# Patient Record
Sex: FEMALE | Race: WHITE | Employment: FULL TIME | URBAN - METROPOLITAN AREA
[De-identification: names, ages, dates, MRNs, and addresses within clinical notes are randomized per-mention and may not be internally consistent; named-entity substitution may affect disease eponyms.]

---

## 2020-01-20 ENCOUNTER — OFFICE VISIT (OUTPATIENT)
Dept: FAMILY MEDICINE CLINIC | Age: 60
End: 2020-01-20

## 2020-01-20 VITALS
RESPIRATION RATE: 17 BRPM | HEIGHT: 62 IN | HEART RATE: 63 BPM | OXYGEN SATURATION: 98 % | WEIGHT: 212.8 LBS | DIASTOLIC BLOOD PRESSURE: 72 MMHG | BODY MASS INDEX: 39.16 KG/M2 | TEMPERATURE: 97.8 F | SYSTOLIC BLOOD PRESSURE: 130 MMHG

## 2020-01-20 DIAGNOSIS — E66.01 SEVERE OBESITY (HCC): ICD-10-CM

## 2020-01-20 DIAGNOSIS — E78.00 HYPERCHOLESTEREMIA: ICD-10-CM

## 2020-01-20 DIAGNOSIS — Z11.59 ENCOUNTER FOR HEPATITIS C SCREENING TEST FOR LOW RISK PATIENT: ICD-10-CM

## 2020-01-20 DIAGNOSIS — Z13.1 SCREENING FOR DIABETES MELLITUS: ICD-10-CM

## 2020-01-20 DIAGNOSIS — E55.9 VITAMIN D DEFICIENCY: ICD-10-CM

## 2020-01-20 DIAGNOSIS — I10 ESSENTIAL HYPERTENSION: ICD-10-CM

## 2020-01-20 DIAGNOSIS — Z23 NEED FOR SHINGLES VACCINE: ICD-10-CM

## 2020-01-20 DIAGNOSIS — Z23 ENCOUNTER FOR IMMUNIZATION: ICD-10-CM

## 2020-01-20 DIAGNOSIS — M79.645 BILATERAL THUMB PAIN: Primary | ICD-10-CM

## 2020-01-20 DIAGNOSIS — M79.644 BILATERAL THUMB PAIN: Primary | ICD-10-CM

## 2020-01-20 RX ORDER — POTASSIUM CHLORIDE 20 MEQ/1
TABLET, EXTENDED RELEASE ORAL
COMMUNITY
Start: 2019-10-24 | End: 2020-04-06 | Stop reason: SDUPTHER

## 2020-01-20 RX ORDER — DICLOFENAC SODIUM 10 MG/G
GEL TOPICAL
Qty: 1 EACH | Refills: 1 | Status: SHIPPED | OUTPATIENT
Start: 2020-01-20 | End: 2020-07-01

## 2020-01-20 RX ORDER — SALIVA STIMULANT COMB. NO.4
500 SPRAY, NON-AEROSOL (ML) MUCOUS MEMBRANE
COMMUNITY

## 2020-01-20 RX ORDER — ATENOLOL AND CHLORTHALIDONE TABLET 50; 25 MG/1; MG/1
1 TABLET ORAL DAILY
COMMUNITY
End: 2020-04-06 | Stop reason: SDUPTHER

## 2020-01-20 RX ORDER — SIMVASTATIN 40 MG/1
TABLET, FILM COATED ORAL
COMMUNITY
Start: 2019-10-24 | End: 2020-03-06 | Stop reason: SDUPTHER

## 2020-01-20 RX ORDER — ACETAMINOPHEN 500 MG
TABLET ORAL 2 TIMES DAILY
COMMUNITY
End: 2022-02-07

## 2020-01-20 RX ORDER — HYDROXYZINE 25 MG/1
TABLET, FILM COATED ORAL
COMMUNITY
Start: 2020-01-18 | End: 2020-04-06 | Stop reason: SDUPTHER

## 2020-01-20 NOTE — PROGRESS NOTES
Chief Complaint   Patient presents with   [address]    Joint Pain     bilateral thumbs x 1-2 years       1. Have you been to the ER, urgent care clinic since your last visit? Hospitalized since your last visit? No    2. Have you seen or consulted any other health care providers outside of the [address] [place] [address] since your last visit? Include any pap smears or colon screening. Patient was seen for mammogram at [facility] 2018 will obtain records.

## 2020-01-20 NOTE — PATIENT INSTRUCTIONS
Liraglutide (By injection)   Liraglutide (ket-t-FBSY-tide)  Treats type 2 diabetes and helps with weight loss in certain patients. Also reduces the risk of heart attacks and strokes in patients with type 2 diabetes and heart or blood vessel disease. Brand Name(s): Saxenda, Victoza   There may be other brand names for this medicine. When This Medicine Should Not Be Used: This medicine is not right for everyone. Do not use it if you had an allergic reaction to liraglutide, or if you have multiple endocrine neoplasia syndrome type 2 (MEN 2) or if you or anyone in your family had medullary thyroid cancer. Tell your doctor if you are pregnant or have become pregnant while you are using this medicine. How to Use This Medicine:   Injectable  · Your doctor will prescribe your exact dose and tell you how often it should be given. This medicine is given as a shot under the skin of your stomach, thighs, or upper arms. · If you use insulin in addition to this medicine, do not mix them into the same syringe. You may give the shots in the same area (including your stomach), but do not give the shots right next to each other. · You may be taught how to give your medicine at home. Make sure you understand all instructions before giving yourself an injection. Do not use more medicine or use it more often than your doctor tells you to. · Check the liquid in the pen. It should be clear and colorless. Do not use it if it is cloudy, discolored, or has particles in it. · You will be shown the body areas where this shot can be given. Use a different body area each time you give yourself a shot. Keep track of where you give each shot to make sure you rotate body areas. · Use a new needle and syringe each time you inject your medicine. · Never share medicine pens with others under any circumstances. Sharing needles or pens can result in transmission of infection.   · Drink extra fluids so you will urinate more often and help prevent kidney problems. · This medicine should come with a Medication Guide. Ask your pharmacist for a copy if you do not have one. · Missed dose: If you miss a dose of this medicine, use it as soon as you remember. Then take your next dose at your usual time. Never take extra medicine to make up for a missed dose. If you miss a dose for 3 days or more, call your doctor to talk about how to restart your treatment. · Store your new, unused medicine pen in its original carton in the refrigerator. Protect it from light. Do not freeze this medicine or use it if it has been frozen. You may store the opened medicine pen in the refrigerator or at room temperature for 30 days. Throw away your used pen after 30 days, even if it still has medicine in it. Always remove the needle from the pen before you store it. · Throw away used needles in a hard, closed container that the needles cannot poke through. Keep this container away from children and pets. Drugs and Foods to Avoid:      Ask your doctor or pharmacist before using any other medicine, including over-the-counter medicines, vitamins, and herbal products. Warnings While Using This Medicine:   · Tell your doctor if you are breastfeeding, or if you have kidney disease, liver disease, digestion problems (including gastroparesis), gallbladder disease, or a history of pancreas problems, depression, or angioedema (swelling of the arms, face, hands, mouth, or throat). · Do not use Saxenda® if you are also using Victoza®. They contain the same medicine. · This medicine may cause the following problems:   ¨ Increased risk for thyroid tumors  ¨ Pancreatitis  ¨ Low blood sugar  ¨ Kidney problems  ¨ Gallbladder problems, including gallstones  ¨ Thoughts of hurting yourself Linh Carry)  · Your doctor will do lab tests at regular visits to check on the effects of this medicine. Keep all appointments. · Keep all medicine out of the reach of children.  Never share your medicine with anyone. Possible Side Effects While Using This Medicine:   Call your doctor right away if you notice any of these side effects:  · Allergic reaction: Itching or hives, swelling in your face or hands, swelling or tingling in your mouth or throat, chest tightness, trouble breathing  · Change in how much or how often you urinate, painful or burning urination  · Feeling sad or depressed, thoughts of suicide, unusual changes in mood or behavior  · Shaking, trembling, sweating, fast or pounding heartbeat, fainting, hunger, confusion  · Sudden and severe stomach pain, nausea, vomiting, fever, lightheadedness  · Trouble breathing or swallowing, a lump in your neck, hoarseness when speaking  · Yellow skin or eyes  If you notice these less serious side effects, talk with your doctor:   · Decreased appetite  · Diarrhea, constipation, stomach upset  · Dizziness  · Headache  · Redness, itching, swelling, or any changes in your skin where the shot was given  If you notice other side effects that you think are caused by this medicine, tell your doctor. Call your doctor for medical advice about side effects. You may report side effects to FDA at 0-138-FDA-5703  © 2017 [address] Information is for End User's use only and may not be sold, redistributed or otherwise used for commercial purposes. The above information is an  only. It is not intended as medical advice for individual conditions or treatments. Talk to your doctor, nurse or pharmacist before following any medical regimen to see if it is safe and effective for you. Thumb Arthritis: Exercises  Introduction  Here are some examples of exercises for you to try. The exercises may be suggested for a condition or for rehabilitation. Start each exercise slowly. Ease off the exercises if you start to have pain. You will be told when to start these exercises and which ones will work best for you.   How to do the exercises  Thumb IP flexion    1. Place your forearm and hand on a table with your affected thumb pointing up. 2. With your other hand, hold your thumb steady just below the joint nearest your thumbnail. 3. Bend the tip of your thumb downward, then straighten it. 4. Repeat 8 to 12 times. 5. Switch hands and repeat steps 1 through 4, even if only one thumb is sore. Thumb MP flexion    1. Place your forearm and hand on a table with your affected thumb pointing up. 2. With your other hand, hold the base of your thumb and palm steady. 3. Bend your thumb downward where it meets your palm, then straighten it. 4. Repeat 8 to 12 times. 5. Switch hands and repeat steps 1 through 4, even if only one thumb is sore. Thumb opposition    1. With your affected hand, point your fingers and thumb straight up. Your wrist should be relaxed, following the line of your fingers and thumb. 2. Touch your affected thumb to each finger, one finger at a time. This will look like an \"okay\" sign, but try to keep your other fingers straight and pointing upward as much as you can. 3. Repeat 8 to 12 times. 4. Switch hands and repeat steps 1 through 3, even if only one thumb is sore. Follow-up care is a key part of your treatment and safety. Be sure to make and go to all appointments, and call your doctor if you are having problems. It's also a good idea to know your test results and keep a list of the medicines you take. Where can you learn more? Go to http://glen-althea.info/. Enter P104 in the search box to learn more about \"Thumb Arthritis: Exercises. \"  Current as of: June 26, 2019  Content Version: 12.2  © 1846-3591 [employer]. Care instructions adapted under license by [employer] (which disclaims liability or warranty for this information).  If you have questions about a medical condition or this instruction, always ask your healthcare professional. Jack Savage disclaims any warranty or liability for your use of this information. Thumb Arthritis: Exercises  Introduction  Here are some examples of exercises for you to try. The exercises may be suggested for a condition or for rehabilitation. Start each exercise slowly. Ease off the exercises if you start to have pain. You will be told when to start these exercises and which ones will work best for you. How to do the exercises  Thumb IP flexion    6. Place your forearm and hand on a table with your affected thumb pointing up. 7. With your other hand, hold your thumb steady just below the joint nearest your thumbnail. 8. Bend the tip of your thumb downward, then straighten it. 9. Repeat 8 to 12 times. 10. Switch hands and repeat steps 1 through 4, even if only one thumb is sore. Thumb MP flexion    6. Place your forearm and hand on a table with your affected thumb pointing up. 7. With your other hand, hold the base of your thumb and palm steady. 8. Bend your thumb downward where it meets your palm, then straighten it. 9. Repeat 8 to 12 times. 10. Switch hands and repeat steps 1 through 4, even if only one thumb is sore. Thumb opposition    5. With your affected hand, point your fingers and thumb straight up. Your wrist should be relaxed, following the line of your fingers and thumb. 6. Touch your affected thumb to each finger, one finger at a time. This will look like an \"okay\" sign, but try to keep your other fingers straight and pointing upward as much as you can. 7. Repeat 8 to 12 times. 8. Switch hands and repeat steps 1 through 3, even if only one thumb is sore. Follow-up care is a key part of your treatment and safety. Be sure to make and go to all appointments, and call your doctor if you are having problems. It's also a good idea to know your test results and keep a list of the medicines you take. Where can you learn more? Go to http://glen-althea.info/.   Enter W90 in the search box to learn more about \"Thumb Arthritis: Exercises. \"  Current as of: June 26, 2019  Content Version: 12.2  © 7308-1397 [employer]. Care instructions adapted under license by [employer] (which disclaims liability or warranty for this information). If you have questions about a medical condition or this instruction, always ask your healthcare professional. [place] [address] any warranty or liability for your use of this information.

## 2020-01-20 NOTE — PROGRESS NOTES
Sia   62 y.o. female  1960  [address] [address]  <[id number]>     BUNNY Yu [address]       Encounter Date: 1/20/2020           New Patient Visit Note: Augustina Loya MD    Previous PCP: Dr. Poonam Holland [facility]    Reason for Appointment:  Chief Complaint   Patient presents with   [facility]    Joint Pain     bilateral thumbs x 1-2 years       History of Present Illness:  History provided by patient    Sia  is a 62 y.o. female who presents to clinic today for:    Blt Thumb Pain  Duration: 2768-8516  Severity: 7-8/10  Setting:  When she bends it a certain way  Timing: comes and goes lasting a few seconds  Frequency: occurs daily  Medicines: does not take any medicine for it    Hypercholesterolemia  Duration: since around age 46  Medicine: Simvastatin  Side Effects: denies any muscle aches    HTN  Duration: diagnosed around age 46  Medications: Atenolol- Chlorthalidone  Diet: does not adhere adhere to low salt diet  Exercise: recently joined a gym    Obesity  BMI 39.56  Diet: tries to eat healthy  Exercise: recently joined a gym and plans to exercise more    Vitamin D Low  - Takes vit D 2000 units daily      Review of Systems   Denies dyspnea or CP. All other ROS were reviewed and are negative except as discussed in HPI      Allergies: Pcn [penicillins]    Medications: (Updated to reflect final medication list after visit)    Current Outpatient Medications:     simvastatin (ZOCOR) 40 mg tablet, , Disp: , Rfl:     potassium chloride (K-DUR, KLOR-CON) 20 mEq tablet, , Disp: , Rfl:     hydroxyzine HCL (ATARAX) 25 mg tablet, , Disp: , Rfl:     atenoloL-chlorthalidone (TENORETIC) 50-25 mg per tablet, Take 1 Tab by mouth daily.  Indications: 1/2  a tab qd, Disp: , Rfl:     Flaxseed Oil oil, by Does Not Apply route., Disp: , Rfl:     cranberry extract (CRAN-MAX) 500 mg cap capsule, Take 500 mg by mouth., Disp: , Rfl:    B.infantis-B.ani-B.long-B.bifi (PROBIOTIC 4X) 10-15 mg TbEC, Take  by mouth., Disp: , Rfl:     cholecalciferol (VITAMIN D3) (2,000 UNITS /50 MCG) cap capsule, Take  by mouth two (2) times a day., Disp: , Rfl:     varicella-zoster recombinant, PF, (SHINGRIX) 50 mcg/0.5 mL susr injection, 0.5 mL by IntraMUSCular route once for 1 dose., Disp: 0.5 mL, Rfl: 1    diclofenac (VOLTAREN) 1 % gel, Apply  to affected area two (2) times daily as needed for Pain., Disp: 1 Each, Rfl: 1    History  Patient Care Team:  Sarbjit Sen MD as PCP - General (Family Practice)  Shawn Francisco DO as Physician (Obstetrics & Gynecology)       Past Medical History: she has a past medical history of BMI 39.0-39.9,adult (1/20/2020), Essential hypertension (1/20/2020), Hypercholesteremia (1/20/2020), Severe obesity ([address] (1/20/2020), and Vitamin D deficiency (1/20/2020). Past Surgical History: she has a past surgical history that includes hx hysterectomy; hx shoulder arthroscopy (Right); and hx knee arthroscopy (Left). Family Medical History: family history includes Cancer in her father and mother. Father with Lung cancer; Mother with breast cancer    Social History: she reports that she has never smoked. She has never used smokeless tobacco. She reports previous alcohol use. She reports that she does not use drugs. 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 HENT:      Head: Normocephalic and atraumatic. Right Ear: Hearing normal. No drainage. No middle ear effusion. There is no impacted cerumen. Tympanic membrane is not injected or erythematous. Left Ear: Hearing normal. No drainage. No middle ear effusion.  There is no impacted cerumen. Tympanic membrane is not injected or erythematous. Nose: Nose normal. No nasal deformity or septal deviation. Mouth/Throat:      Lips: Pink. No lesions. Mouth: Mucous membranes are moist.      Palate: No mass. Pharynx: Oropharynx is clear. Uvula midline. No pharyngeal swelling, oropharyngeal exudate or posterior oropharyngeal erythema. Tonsils: No tonsillar exudate. Eyes:      General: Lids are normal. Vision grossly intact. Gaze aligned appropriately. Extraocular Movements: Extraocular movements intact. Conjunctiva/sclera:      Right eye: Right conjunctiva is not injected. Left eye: Left conjunctiva is not injected. Pupils: Pupils are equal, round, and reactive to light. Neck:      Musculoskeletal: Normal range of motion and neck supple. No neck rigidity or muscular tenderness. Vascular: No carotid bruit. Cardiovascular:      Rate and Rhythm: Normal rate and regular rhythm. Heart sounds: No murmur. No friction rub. No gallop. Pulmonary:      Effort: No respiratory distress. Breath sounds: No wheezing, rhonchi or rales. Abdominal:      General: Bowel sounds are normal. There is no distension or abdominal bruit. Tenderness: There is no tenderness. Musculoskeletal: Normal range of motion. Cervical back: Normal.   Lymphadenopathy:      Cervical: No cervical adenopathy. Neurological:      General: No focal deficit present. Motor: Motor function is intact. Coordination: Coordination is intact. Gait: Gait is intact. Deep Tendon Reflexes:      Reflex Scores:       Patellar reflexes are 2+ on the right side and 2+ on the left side.   Psychiatric:         Attention and Perception: Attention and perception normal.         Mood and Affect: Mood and affect normal.         Speech: Speech normal.         Cognition and Memory: Cognition and memory normal.         Assessment & Plan:    Diagnoses and all orders for this visit:    1. Bilateral thumb pain: Chronic, worsening; given duration of pain will obtain xrays to evaluate further. Will treat with diclofenac for pain. -     XR THUMB LT MIN 2 V; Future  -     XR THUMB RT MIN 2 V; Future  -     diclofenac (VOLTAREN) 1 % gel; Apply  to affected area two (2) times daily as needed for Pain.  - Consider referral to PT for further treatment    2. Severe obesity ([facility] [address]): I have reviewed/discussed the above normal BMI with the patient. I have recommended the following interventions: dietary management education, guidance, and counseling, encourage exercise, monitor weight and prescribed dietary intake.   -     TSH 3RD GENERATION  -     CBC WITH AUTOMATED DIFF    3. BMI 39.0-39.9,adult (see above)    4. Vitamin D deficiency: Chronic, will check labs  -     VITAMIN D, 25 HYDROXY    5. Hypercholesteremia: Chronic, stable  -     LIPID PANEL  -     METABOLIC PANEL, COMPREHENSIVE  - Continue simvastatin    6. Essential hypertension: Chronic, stable  -     METABOLIC PANEL, COMPREHENSIVE  - continue current regimen    7. Need for shingles vaccine  -     varicella-zoster recombinant, PF, (SHINGRIX) 50 mcg/0.5 mL susr injection; 0.5 mL by IntraMUSCular route once for 1 dose. 8. Screening for diabetes mellitus  -     HEMOGLOBIN A1C WITH EAG    9. Encounter for hepatitis C screening test for low risk patient  -     HEPATITIS C AB, RFLX TO QT BY PCR    10. Encounter for immunization  -     INFLUENZA VIRUS VAC QUAD,SPLIT,PRESV FREE SYRINGE IM  -     MN IMMUNIZ ADMIN,1 SINGLE/COMB VAC/TOXOID          I have discussed the diagnosis with the patient and the intended plan as seen in the above orders. The patient has received an after-visit summary along with patient information handout. I have discussed medication side effects and warnings with the patient as well. Dispostion  Follow-up and Dispositions    · Return in about 4 weeks (around 2/17/2020) for HTN, BMI.            Kiah A Kenneth Larsen MD

## 2020-01-21 LAB
25(OH)D3+25(OH)D2 SERPL-MCNC: 28.7 NG/ML (ref 30–100)
ALBUMIN SERPL-MCNC: 4.3 G/DL (ref 3.8–4.8)
ALBUMIN/GLOB SERPL: 1.5 {RATIO} (ref 1.2–2.2)
ALP SERPL-CCNC: 111 IU/L (ref 39–117)
ALT SERPL-CCNC: 51 IU/L (ref 0–32)
AST SERPL-CCNC: 33 IU/L (ref 0–40)
BASOPHILS # BLD AUTO: 0 X10E3/UL (ref 0–0.2)
BASOPHILS NFR BLD AUTO: 0 %
BILIRUB SERPL-MCNC: 0.3 MG/DL (ref 0–1.2)
BUN SERPL-MCNC: 9 MG/DL (ref 8–27)
BUN/CREAT SERPL: 14 (ref 12–28)
CALCIUM SERPL-MCNC: 9.2 MG/DL (ref 8.7–10.3)
CHLORIDE SERPL-SCNC: 98 MMOL/L (ref 96–106)
CHOLEST SERPL-MCNC: 186 MG/DL (ref 100–199)
CO2 SERPL-SCNC: 27 MMOL/L (ref 20–29)
CREAT SERPL-MCNC: 0.65 MG/DL (ref 0.57–1)
EOSINOPHIL # BLD AUTO: 0.2 X10E3/UL (ref 0–0.4)
EOSINOPHIL NFR BLD AUTO: 2 %
ERYTHROCYTE [DISTWIDTH] IN BLOOD BY AUTOMATED COUNT: 12.9 % (ref 11.7–15.4)
EST. AVERAGE GLUCOSE BLD GHB EST-MCNC: 126 MG/DL
GLOBULIN SER CALC-MCNC: 2.9 G/DL (ref 1.5–4.5)
GLUCOSE SERPL-MCNC: 91 MG/DL (ref 65–99)
HBA1C MFR BLD: 6 % (ref 4.8–5.6)
HCT VFR BLD AUTO: 41.1 % (ref 34–46.6)
HCV AB S/CO SERPL IA: <0.1 S/CO RATIO (ref 0–0.9)
HCV AB SERPL QL IA: NORMAL
HDLC SERPL-MCNC: 60 MG/DL
HGB BLD-MCNC: 14.1 G/DL (ref 11.1–15.9)
IMM GRANULOCYTES # BLD AUTO: 0 X10E3/UL (ref 0–0.1)
IMM GRANULOCYTES NFR BLD AUTO: 0 %
INTERPRETATION, 910389: NORMAL
LDLC SERPL CALC-MCNC: 92 MG/DL (ref 0–99)
LYMPHOCYTES # BLD AUTO: 2.2 X10E3/UL (ref 0.7–3.1)
LYMPHOCYTES NFR BLD AUTO: 31 %
MCH RBC QN AUTO: 29.3 PG (ref 26.6–33)
MCHC RBC AUTO-ENTMCNC: 34.3 G/DL (ref 31.5–35.7)
MCV RBC AUTO: 85 FL (ref 79–97)
MONOCYTES # BLD AUTO: 0.6 X10E3/UL (ref 0.1–0.9)
MONOCYTES NFR BLD AUTO: 8 %
NEUTROPHILS # BLD AUTO: 4 X10E3/UL (ref 1.4–7)
NEUTROPHILS NFR BLD AUTO: 59 %
PLATELET # BLD AUTO: 281 X10E3/UL (ref 150–450)
POTASSIUM SERPL-SCNC: 3.8 MMOL/L (ref 3.5–5.2)
PROT SERPL-MCNC: 7.2 G/DL (ref 6–8.5)
RBC # BLD AUTO: 4.81 X10E6/UL (ref 3.77–5.28)
SODIUM SERPL-SCNC: 141 MMOL/L (ref 134–144)
TRIGL SERPL-MCNC: 168 MG/DL (ref 0–149)
TSH SERPL DL<=0.005 MIU/L-ACNC: 1.99 UIU/ML (ref 0.45–4.5)
VLDLC SERPL CALC-MCNC: 34 MG/DL (ref 5–40)
WBC # BLD AUTO: 7 X10E3/UL (ref 3.4–10.8)

## 2020-01-24 ENCOUNTER — HOSPITAL ENCOUNTER (OUTPATIENT)
Dept: GENERAL RADIOLOGY | Age: 60
Discharge: HOME | End: 2020-01-24
Payer: COMMERCIAL

## 2020-01-24 DIAGNOSIS — M79.644 BILATERAL THUMB PAIN: ICD-10-CM

## 2020-01-24 DIAGNOSIS — M79.645 BILATERAL THUMB PAIN: ICD-10-CM

## 2020-01-24 PROCEDURE — 73140 X-RAY EXAM OF FINGER(S): CPT

## 2020-01-28 ENCOUNTER — TELEPHONE (OUTPATIENT)
Dept: FAMILY MEDICINE CLINIC | Age: 60
End: 2020-01-28

## 2020-01-30 ENCOUNTER — TELEPHONE (OUTPATIENT)
Dept: FAMILY MEDICINE CLINIC | Age: 60
End: 2020-01-30

## 2020-01-30 DIAGNOSIS — M19.049 HAND ARTHRITIS: Primary | ICD-10-CM

## 2020-01-30 PROBLEM — R79.89 ELEVATED LFTS: Status: ACTIVE | Noted: 2020-01-30

## 2020-01-30 PROBLEM — R73.03 PREDIABETES: Status: ACTIVE | Noted: 2020-01-30

## 2020-01-30 NOTE — TELEPHONE ENCOUNTER
01/30/20  3:14 PM    Called patient regarding lab and imaging results. Labs s/f A1c of 6.0 (prediabets), elevated ALT, and low vit D. Will plan to recheck A1c, LFTs, and Vit in April. 2020. Imaging showed degenerative changes to hands. Will provide referral to ortho. Patient expresses agreement.      Andriy Denny MD

## 2020-02-06 ENCOUNTER — TELEPHONE (OUTPATIENT)
Dept: FAMILY MEDICINE CLINIC | Age: 60
End: 2020-02-06

## 2020-02-06 NOTE — TELEPHONE ENCOUNTER
----- Message from Magy Van sent at 2/6/2020 12:52 PM EST -----  Regarding: Dr. Alex Dunlap: [phone]  Caller's first and last name: n/a  Reason for call: Fax Verification   Callback required yes/no and why: Yes   Best contact number(s): [phone]  Details to clarify the request: Dr. Murphy Bass needs the [facility] of the pt hands faxed over to their practice.  The fax is [phone]

## 2020-02-06 NOTE — TELEPHONE ENCOUNTER
X -RAY results faxed to Dr. Vikas Pacheco office at [phone] on 02/06/2020 @ 2:11 pm. Confirmation received.

## 2020-03-06 ENCOUNTER — TELEPHONE (OUTPATIENT)
Dept: FAMILY MEDICINE CLINIC | Age: 60
End: 2020-03-06

## 2020-03-06 DIAGNOSIS — E78.00 HYPERCHOLESTEREMIA: Primary | ICD-10-CM

## 2020-03-06 RX ORDER — SIMVASTATIN 40 MG/1
40 TABLET, FILM COATED ORAL
Qty: 90 TAB | Refills: 1 | Status: SHIPPED | OUTPATIENT
Start: 2020-03-06 | End: 2020-04-06 | Stop reason: SDUPTHER

## 2020-03-06 NOTE — TELEPHONE ENCOUNTER
Refilled as requested    Stefany Stewart MD    -------------------------------------------------------------------------------------------------      ----- Message from Luz Elena Staley sent at 3/6/2020 10:05 AM EST -----  Regarding: Dr. Nile Tyler first and last name: Mallorie Kendrick  Reason for call:   Pt needs a refill of simvastatin 40 mg. Pt said she'll run out of it before her appt on 4/14/2020.  It has to go to [address] [place] [address] on [address] [facility], [phone]  Callback required yes/no and why: yes  Best contact number(s):  [phone]  Details to clarify the request:

## 2020-04-05 ENCOUNTER — PATIENT MESSAGE (OUTPATIENT)
Dept: FAMILY MEDICINE CLINIC | Age: 60
End: 2020-04-05

## 2020-04-05 DIAGNOSIS — E78.00 HYPERCHOLESTEREMIA: ICD-10-CM

## 2020-04-06 RX ORDER — POTASSIUM CHLORIDE 20 MEQ/1
20 TABLET, EXTENDED RELEASE ORAL DAILY
Qty: 90 TAB | Refills: 0 | Status: SHIPPED | OUTPATIENT
Start: 2020-04-06 | End: 2020-04-07 | Stop reason: SDUPTHER

## 2020-04-06 RX ORDER — SIMVASTATIN 40 MG/1
40 TABLET, FILM COATED ORAL
Qty: 90 TAB | Refills: 0 | Status: SHIPPED | OUTPATIENT
Start: 2020-04-06 | End: 2021-01-05 | Stop reason: SDUPTHER

## 2020-04-06 RX ORDER — HYDROXYZINE 25 MG/1
25 TABLET, FILM COATED ORAL
Qty: 90 TAB | Refills: 0 | Status: SHIPPED | OUTPATIENT
Start: 2020-04-06 | End: 2021-07-06

## 2020-04-06 RX ORDER — ATENOLOL AND CHLORTHALIDONE TABLET 50; 25 MG/1; MG/1
0.5 TABLET ORAL DAILY
Qty: 90 TAB | Refills: 0 | Status: SHIPPED | OUTPATIENT
Start: 2020-04-06 | End: 2020-09-28 | Stop reason: SDUPTHER

## 2020-04-06 NOTE — TELEPHONE ENCOUNTER
From: Jelena Moser  To: Axel Rajan MD  Sent: 4/5/2020 11:40 AM EDT  Subject: Prescription Question    I have an appointment for a physical on April 14. I would like to reschedule this for sometime in July. In the meantime, I will need refills on my medications. I prefer to use [facility] at this time for home delivery of a 90-day supply rather than go into the pharmacy.  I need   Atenol/chlorthal tab 50/25 1/2 table per day  Pot CL Micro TAC 20 MEQ ER 2 tablets per day  Simvastatin 40 mg 1 tablet per day  Hydroxyzine HCL 25 mg 1 tablet twice per day    Thanks,  Adilia Taylor

## 2020-05-08 ENCOUNTER — VIRTUAL VISIT (OUTPATIENT)
Dept: FAMILY MEDICINE CLINIC | Age: 60
End: 2020-05-08

## 2020-05-08 DIAGNOSIS — J45.40 MODERATE PERSISTENT REACTIVE AIRWAY DISEASE WITHOUT COMPLICATION: Primary | ICD-10-CM

## 2020-05-08 DIAGNOSIS — J30.1 SEASONAL ALLERGIC RHINITIS DUE TO POLLEN: ICD-10-CM

## 2020-05-08 RX ORDER — MELOXICAM 15 MG/1
1 TABLET ORAL AS NEEDED
COMMUNITY
Start: 2020-02-21 | End: 2020-08-11 | Stop reason: SDUPTHER

## 2020-05-08 RX ORDER — ALBUTEROL SULFATE 90 UG/1
1-2 AEROSOL, METERED RESPIRATORY (INHALATION)
Qty: 1 INHALER | Refills: 2 | Status: SHIPPED | OUTPATIENT
Start: 2020-05-08 | End: 2021-03-11

## 2020-05-08 RX ORDER — BUDESONIDE AND FORMOTEROL FUMARATE DIHYDRATE 80; 4.5 UG/1; UG/1
2 AEROSOL RESPIRATORY (INHALATION) 2 TIMES DAILY
Qty: 1 INHALER | Refills: 5 | Status: SHIPPED | OUTPATIENT
Start: 2020-05-08 | End: 2020-07-01

## 2020-05-08 NOTE — PROGRESS NOTES
Shanel Horn is a 62 y.o. female who was seen by synchronous (real-time) audio-video technology on 5/8/2020. Consent: Shanel Horn, who was seen by synchronous (real-time) audio-video technology, and/or her healthcare decision maker, is aware that this patient-initiated, Telehealth encounter on 5/8/2020 is a billable service, with coverage as determined by her insurance carrier. She is aware that she may receive a bill and has provided verbal consent to proceed: Yes. Assessment & Plan:   Diagnoses and all orders for this visit:    1. Moderate persistent reactive airway disease without complication  Begin LABA/ICS. Refill Albuterol PRN. Recommended updated allergy and asthma testing.   -     albuterol (PROVENTIL HFA, VENTOLIN HFA, PROAIR HFA) 90 mcg/actuation inhaler; Take 1-2 Puffs by inhalation every four (4) hours as needed for Wheezing.  -     budesonide-formoteroL (SYMBICORT) 80-4.5 mcg/actuation HFAA; Take 2 Puffs by inhalation two (2) times a day. Rinse mouth with water after use. -     REFERRAL TO ALLERGY    2. Seasonal allergic rhinitis due to pollen  As above. -     REFERRAL TO ALLERGY      I spent at least 23 minutes on this visit with this established patient. [phone]  Subjective:   Shanel Horn is a 62 y.o. female who was seen for Cough (x 3 months mixed cough, states it is productive at night )    Cough  Patient complains of nonproductive cough. Symptoms began 3 months ago. The cough is non-productive, without wheezing, dyspnea or hemoptysis and is aggravated by dust, pollens, fumes and reclining position. Denies:heartburn, postnasal drip. Patient does not have new pets. Patient does not have a history of asthma. Patient does have a history of environmental allergens. Patient does not have recent travel. Patient does not have a history of smoking. Patient  does not have previous Chest X-ray. Prior to Admission medications    Medication Sig Start Date End Date Taking? Authorizing Provider   albuterol (PROVENTIL HFA, VENTOLIN HFA, PROAIR HFA) 90 mcg/actuation inhaler Take 1-2 Puffs by inhalation every four (4) hours as needed for Wheezing. 5/8/20  Yes Bernie Lipscomb NP   budesonide-formoteroL (SYMBICORT) 80-4.5 mcg/actuation HFAA Take 2 Puffs by inhalation two (2) times a day. Rinse mouth with water after use. 5/8/20  Yes Bernie Lipscomb NP   potassium chloride (K-DUR, KLOR-CON) 20 mEq tablet Take 1 Tab by mouth two (2) times a day. 4/8/20  Yes Yuniel Duncan MD   atenoloL-chlorthalidone (TENORETIC) 50-25 mg per tablet Take 0.5 Tabs by mouth daily. 4/6/20  Yes Yuniel Duncan MD   simvastatin (ZOCOR) 40 mg tablet Take 1 Tab by mouth nightly. 4/6/20  Yes Yuniel Duncan MD   hydrOXYzine HCL (ATARAX) 25 mg tablet Take 1 Tab by mouth three (3) times daily as needed for Itching. 4/6/20  Yes Yuniel Duncan MD   Flaxseed Oil oil by Does Not Apply route. Yes Provider, Historical   cranberry extract (CRAN-MAX) 500 mg cap capsule Take 500 mg by mouth. Yes Provider, Historical   B.infantis-B.ani-B.long-B.bifi (PROBIOTIC 4X) 10-15 mg TbEC Take  by mouth. Yes Provider, Historical   meloxicam (MOBIC) 15 mg tablet Take 1 Tab by mouth as needed. 2/21/20   Provider, Historical   cholecalciferol (VITAMIN D3) (2,000 UNITS /50 MCG) cap capsule Take  by mouth two (2) times a day. Provider, Historical   diclofenac (VOLTAREN) 1 % gel Apply  to affected area two (2) times daily as needed for Pain.  1/20/20   Yuniel Duncan MD     Allergies   Allergen Reactions    Pcn [Penicillins] Swelling       Past Medical History:   Diagnosis Date    BMI 39.0-39.9,adult 1/20/2020    Essential hypertension 1/20/2020    Hypercholesteremia 1/20/2020    Severe obesity ([address] 1/20/2020    Vitamin D deficiency 1/20/2020     Past Surgical History:   Procedure Laterality Date    HX HYSTERECTOMY      HX KNEE ARTHROSCOPY Left     HX SHOULDER ARTHROSCOPY Right        ROS  See HPI    Objective: There were no vitals taken for this visit. General: alert, cooperative, no distress   Mental  status: normal mood, behavior, speech, dress, motor activity, and thought processes, able to follow commands   HENT: NCAT   Neck: no visualized mass   Resp: no respiratory distress   Neuro: no gross deficits   Skin: no discoloration or lesions of concern on visible areas   Psychiatric: normal affect, consistent with stated mood, no evidence of hallucinations       We discussed the expected course, resolution and complications of the diagnosis(es) in detail. Medication risks, benefits, costs, interactions, and alternatives were discussed as indicated. I advised her to contact the office if her condition worsens, changes or fails to improve as anticipated. She expressed understanding with the diagnosis(es) and plan. Chaim Nails is a 62 y.o. female who was evaluated by a video visit encounter for concerns as above. Patient identification was verified prior to start of the visit. A caregiver was present when appropriate. Due to this being a TeleHealth encounter (During [facility]-15 public health emergency), evaluation of the following organ systems was limited: Vitals/Constitutional/EENT/Resp/CV/GI//MS/Neuro/Skin/Heme-Lymph-Imm. Pursuant to the emergency declaration under the [facility], 1135 waiver authority and the [employer] and [employer] Act, this Virtual  Visit was conducted, with patient's (and/or legal guardian's) consent, to reduce the patient's risk of exposure to COVID-19 and provide necessary medical care. Services were provided through a video synchronous discussion virtually to substitute for in-person clinic visit. Patient and provider were located at their individual homes.       Michelle Syed NP

## 2020-05-08 NOTE — PROGRESS NOTES
Chief Complaint   Patient presents with    Cough     x 3 months mixed cough, states it is productive at night      1. Have you been to the ER, urgent care clinic since your last visit? Hospitalized since your last visit? No    2. Have you seen or consulted any other health care providers outside of the [address] [place] [address] since your last visit? Include any pap smears or colon screening. No     Patient presents for VV for cough onset x 3 months ago. Mixed cough mainly productive at night.

## 2020-06-02 ENCOUNTER — E-VISIT (OUTPATIENT)
Dept: FAMILY MEDICINE CLINIC | Age: 60
End: 2020-06-02

## 2020-06-02 DIAGNOSIS — J45.40 MODERATE PERSISTENT REACTIVE AIRWAY DISEASE WITHOUT COMPLICATION: Primary | ICD-10-CM

## 2020-07-01 ENCOUNTER — OFFICE VISIT (OUTPATIENT)
Dept: FAMILY MEDICINE CLINIC | Age: 60
End: 2020-07-01

## 2020-07-01 VITALS
BODY MASS INDEX: 37.8 KG/M2 | HEART RATE: 66 BPM | RESPIRATION RATE: 18 BRPM | DIASTOLIC BLOOD PRESSURE: 74 MMHG | OXYGEN SATURATION: 96 % | WEIGHT: 205.4 LBS | HEIGHT: 62 IN | TEMPERATURE: 98.1 F | SYSTOLIC BLOOD PRESSURE: 118 MMHG

## 2020-07-01 DIAGNOSIS — M19.049 HAND ARTHRITIS: ICD-10-CM

## 2020-07-01 DIAGNOSIS — M79.645 BILATERAL THUMB PAIN: ICD-10-CM

## 2020-07-01 DIAGNOSIS — R73.03 PREDIABETES: ICD-10-CM

## 2020-07-01 DIAGNOSIS — E78.00 HYPERCHOLESTEREMIA: ICD-10-CM

## 2020-07-01 DIAGNOSIS — I10 ESSENTIAL HYPERTENSION: ICD-10-CM

## 2020-07-01 DIAGNOSIS — Z00.00 ANNUAL PHYSICAL EXAM: Primary | ICD-10-CM

## 2020-07-01 DIAGNOSIS — E66.01 SEVERE OBESITY (HCC): ICD-10-CM

## 2020-07-01 DIAGNOSIS — Z12.39 SCREENING FOR BREAST CANCER: ICD-10-CM

## 2020-07-01 DIAGNOSIS — M79.644 BILATERAL THUMB PAIN: ICD-10-CM

## 2020-07-01 DIAGNOSIS — E55.9 VITAMIN D DEFICIENCY: ICD-10-CM

## 2020-07-01 DIAGNOSIS — R79.89 ELEVATED LFTS: ICD-10-CM

## 2020-07-01 PROBLEM — R05.3 CHRONIC COUGH: Status: ACTIVE | Noted: 2020-07-01

## 2020-07-01 NOTE — PATIENT INSTRUCTIONS
Well Visit, Women 48 to 72: Care Instructions  Your Care Instructions     Physical exams can help you stay healthy. Your doctor has checked your overall health and may have suggested ways to take good care of yourself. He or she also may have recommended tests. At home, you can help prevent illness with healthy eating, regular exercise, and other steps. Follow-up care is a key part of your treatment and safety. Be sure to make and go to all appointments, and call your doctor if you are having problems. It's also a good idea to know your test results and keep a list of the medicines you take. How can you care for yourself at home? · Reach and stay at a healthy weight. This will lower your risk for many problems, such as obesity, diabetes, heart disease, and high blood pressure. · Get at least 30 minutes of exercise on most days of the week. Walking is a good choice. You also may want to do other activities, such as running, swimming, cycling, or playing tennis or team sports. · Do not smoke. Smoking can make health problems worse. If you need help quitting, talk to your doctor about stop-smoking programs and medicines. These can increase your chances of quitting for good. · Protect your skin from too much sun. When you're outdoors from 10 a.m. to 4 p.m., stay in the shade or cover up with clothing and a hat with a wide brim. Wear sunglasses that block UV rays. Even when it's cloudy, put broad-spectrum sunscreen (SPF 30 or higher) on any exposed skin. · See a dentist one or two times a year for checkups and to have your teeth cleaned. · Wear a seat belt in the car. Follow your doctor's advice about when to have certain tests. These tests can spot problems early. · Cholesterol. Your doctor will tell you how often to have this done based on your age, family history, or other things that can increase your risk for heart attack and stroke. · Blood pressure.  Have your blood pressure checked during a routine doctor visit. Your doctor will tell you how often to check your blood pressure based on your age, your blood pressure results, and other factors. · Mammogram. Ask your doctor how often you should have a mammogram, which is an X-ray of your breasts. A mammogram can spot breast cancer before it can be felt and when it is easiest to treat. · Pap test and pelvic exam. Ask your doctor how often you should have a Pap test. You may not need to have a Pap test as often as you used to. · Vision. Have your eyes checked every year or two or as often as your doctor suggests. Some experts recommend that you have yearly exams for glaucoma and other age-related eye problems starting at age 48. · Hearing. Tell your doctor if you notice any change in your hearing. You can have tests to find out how well you hear. · Diabetes. Ask your doctor whether you should have tests for diabetes. · Colorectal cancer. Your risk for colorectal cancer gets higher as you get older. Some experts say that adults should start regular screening at age 48 and stop at age 76. Others say to start before age 48 or continue after age 76. Talk with your doctor about your risk and when to start and stop screening. · Thyroid disease. Talk to your doctor about whether to have your thyroid checked as part of a regular physical exam. Women have an increased chance of a thyroid problem. · Osteoporosis. You should begin tests for bone density at age 72. If you are younger than 72, ask your doctor whether you have factors that may increase your risk for this disease. You may want to have this test before age 72. · Heart attack and stroke risk. At least every 4 to 6 years, you should have your risk for heart attack and stroke assessed. Your doctor uses factors such as your age, blood pressure, cholesterol, and whether you smoke or have diabetes to show what your risk for a heart attack or stroke is over the next 10 years.   When should you call for help?  Watch closely for changes in your health, and be sure to contact your doctor if you have any problems or symptoms that concern you. Where can you learn more? Go to http://www.gray.com/  Enter [id number] in the search box to learn more about \"Well Visit, Women 50 to 72: Care Instructions. \"  Current as of: August 22, 2019               Content Version: 12.5  © 0686-2644 Healthwise, [employer]. Care instructions adapted under license by [employer] (which disclaims liability or warranty for this information). If you have questions about a medical condition or this instruction, always ask your healthcare professional. [address] any warranty or liability for your use of this information.

## 2020-07-01 NOTE — PROGRESS NOTES
Chief Complaint   Patient presents with    Complete Physical     1. Have you been to the ER, urgent care clinic since your last visit? Hospitalized since your last visit? No    2. Have you seen or consulted any other health care providers outside of the [address] [place] [address] since your last visit? Include any pap smears or colon screening.  No

## 2020-07-01 NOTE — PROGRESS NOTES
Juan Pablo Li  62 y.o. female  1960  [address]  [id number]     [facility]       Encounter Date: 7/1/2020           Established Patient Visit Note: Jordan Encarnacion MD    Reason for Appointment:  Chief Complaint   Patient presents with    Complete Physical       History of Present Illness:  History provided by patient    Juan Pablo Li is a 62 y.o. female who presents to clinic today for:    Annual Physical  Screenings:   · PAP: managed by OBGYN  · Mammo: due  Immunization  - due for Tdap  Diet/Exercise: She reports that she has been eating healtheir and exericsing more  Tb: does not smoke  Interval history: She reports that she continues to have occasaional thumb pain. She takes meloxicam occasionally. She has prediabetes, and she is working on eating less carbs. For her allergies she  takes hydroxazine w/o any side effects. She is not using symbicort because makes her \"feel like she has laryngititis\". She has been checking her home BP with wrist cuff, but results having been variable. She has a chronic cough, and an xray was ordered on 6/3/20 but she has not yet had this performed. She uses flonase for nasal drainage and she also uses saline rinse    Review of Systems  Review of Systems   Constitutional: Negative for chills and fever. Respiratory: Negative for cough, shortness of breath and wheezing. Cardiovascular: Negative for chest pain and palpitations. Allergies: Pcn [penicillins]    Medications: (Updated to reflect final medication list after visit)    Current Outpatient Medications:     meloxicam (MOBIC) 15 mg tablet, Take 1 Tab by mouth as needed. , Disp: , Rfl:     potassium chloride (K-DUR, KLOR-CON) 20 mEq tablet, Take 1 Tab by mouth two (2) times a day. (Patient taking differently: Take 40 mEq by mouth daily.), Disp: 60 Tab, Rfl: 2    atenoloL-chlorthalidone (TENORETIC) 50-25 mg per tablet, Take 0.5 Tabs by mouth daily. , Disp: 90 Tab, Rfl: 0    simvastatin (ZOCOR) 40 mg tablet, Take 1 Tab by mouth nightly., Disp: 90 Tab, Rfl: 0    hydrOXYzine HCL (ATARAX) 25 mg tablet, Take 1 Tab by mouth three (3) times daily as needed for Itching., Disp: 90 Tab, Rfl: 0    Flaxseed Oil oil, by Does Not Apply route., Disp: , Rfl:     cranberry extract (CRAN-MAX) 500 mg cap capsule, Take 500 mg by mouth., Disp: , Rfl:     B.infantis-B.ani-B.long-B.bifi (PROBIOTIC 4X) 10-15 mg TbEC, Take  by mouth., Disp: , Rfl:     cholecalciferol (VITAMIN D3) (2,000 UNITS /50 MCG) cap capsule, Take  by mouth two (2) times a day., Disp: , Rfl:     albuterol (PROVENTIL HFA, VENTOLIN HFA, PROAIR HFA) 90 mcg/actuation inhaler, Take 1-2 Puffs by inhalation every four (4) hours as needed for Wheezing., Disp: 1 Inhaler, Rfl: 2    History  Patient Care Team:  Lazara Zavala MD as PCP - General (Family Practice)  Lazara Zavala MD as PCP - [facility]  Dionne Philip DO as Physician (Obstetrics & Gynecology)    Past Medical History: she has a past medical history of BMI 39.0-39.9,adult (1/20/2020), Essential hypertension (1/20/2020), Hypercholesteremia (1/20/2020), Severe obesity ([address] (1/20/2020), and Vitamin D deficiency (1/20/2020). Past Surgical History: she has a past surgical history that includes hx hysterectomy; hx shoulder arthroscopy (Right); and hx knee arthroscopy (Left). Family Medical History: family history includes Cancer in her father and mother. Social History: she reports that she has never smoked. She has never used smokeless tobacco. She reports previous alcohol use. She reports that she does not use drugs. No specialty comments available.       Health Maintenance Due   Topic Date Due    DTaP/Tdap/Td series (1 - Tdap) 08/22/1979       Objective:   Visit Vitals  /74 (BP 1 Location: Left arm, BP Patient Position: Sitting)   Pulse 66   Temp 98.1 °F (36.7 °C) (Oral)   Resp 18   Ht 5' 1.5\" (1.562 m)   Wt 205 lb 6.4 oz (93.2 kg)   SpO2 96%   BMI 38.18 kg/m²     Wt Readings from Last 3 Encounters:   07/01/20 205 lb 6.4 oz (93.2 kg)   01/20/20 212 lb 12.8 oz (96.5 kg)       Physical Exam  Vitals signs reviewed. Constitutional:       General: She is not in acute distress. Appearance: Normal appearance. She is normal weight. She is not ill-appearing or toxic-appearing. HENT:      Head: Normocephalic and atraumatic. Right Ear: Hearing, tympanic membrane, ear canal and external ear normal. No drainage. No middle ear effusion. There is no impacted cerumen. Tympanic membrane is not injected or erythematous. Left Ear: Hearing, tympanic membrane, ear canal and external ear normal. No drainage. No middle ear effusion. There is no impacted cerumen. Tympanic membrane is not injected or erythematous. Nose: Nose normal. No nasal deformity or septal deviation. Mouth/Throat:      Lips: Pink. No lesions. Mouth: Mucous membranes are moist.      Palate: No mass. Pharynx: Oropharynx is clear. Uvula midline. No pharyngeal swelling, oropharyngeal exudate or posterior oropharyngeal erythema. Tonsils: No tonsillar exudate. Eyes:      General: Lids are normal. Vision grossly intact. Gaze aligned appropriately. Right eye: No discharge. Left eye: No discharge. Extraocular Movements: Extraocular movements intact. Conjunctiva/sclera: Conjunctivae normal.      Right eye: Right conjunctiva is not injected. Left eye: Left conjunctiva is not injected. Pupils: Pupils are equal, round, and reactive to light. Neck:      Musculoskeletal: Normal range of motion and neck supple. No neck rigidity or muscular tenderness. Vascular: No carotid bruit. Cardiovascular:      Rate and Rhythm: Normal rate and regular rhythm. Pulses: Normal pulses. Heart sounds: Normal heart sounds. No murmur. No friction rub. No gallop.     Pulmonary:      Effort: Pulmonary effort is normal. No respiratory distress. Breath sounds: Normal breath sounds. No stridor. No wheezing, rhonchi or rales. Abdominal:      General: Bowel sounds are normal. There is no distension or abdominal bruit. Tenderness: There is no abdominal tenderness. There is no guarding. Musculoskeletal: Normal range of motion. Cervical back: Normal.   Lymphadenopathy:      Cervical: No cervical adenopathy. Skin:     General: Skin is warm. Coloration: Skin is not jaundiced. Neurological:      General: No focal deficit present. Mental Status: She is alert. Motor: Motor function is intact. Coordination: Coordination is intact. Gait: Gait is intact. Deep Tendon Reflexes:      Reflex Scores:       Patellar reflexes are 2+ on the right side and 2+ on the left side. Psychiatric:         Attention and Perception: Attention and perception normal.         Mood and Affect: Mood and affect normal.         Speech: Speech normal.         Cognition and Memory: Cognition and memory normal.         Assessment & Plan:      ICD-10-CM ICD-9-CM    1. Annual physical exam Z00.00 V70.0    2. Screening for breast cancer Z12.39 V76.10 [facility] MAMMO BI SCREENING INCL CAD   3. Bilateral thumb pain M79.644 729.5 REFERRAL TO PHYSICAL THERAPY    M79.645     4. Prediabetes R73.03 790.29 HEMOGLOBIN A1C WITH EAG   5. Elevated LFTs Q53.43 303.8 METABOLIC PANEL, COMPREHENSIVE   6. Hand arthritis M19.049 716.94    7. Severe obesity (HCC) E66.01 278.01 TSH AND FREE T4   8. Vitamin D deficiency E55.9 268.9 VITAMIN D, 25 HYDROXY   9. Essential hypertension I10 401.9    10. Hypercholesteremia E78.00 272.0 LIPID PANEL     Annual Physical: - Reviewed and addressed patient's medical history and concerns as discussed in note. Reviewed screenings and immunizations. Discussed recommendations for diet, exercise, and lifestyle.    -All conditions listed above: chronic and stable. Will continue current treatment regimen.  Will check labs as reflected above. Discussed following up with specialist as scheduled. Discussed recommendations for diet and exercise. I have discussed the diagnosis with the patient and the intended plan as seen in the above orders. The patient has received an after-visit summary along with patient information handout. I have discussed medication side effects and warnings with the patient as well. Disposition  Follow-up and Dispositions    · Return in about 6 months (around 1/1/2021).            Cedric Sue MD

## 2020-07-02 ENCOUNTER — HOSPITAL ENCOUNTER (OUTPATIENT)
Dept: GENERAL RADIOLOGY | Age: 60
Discharge: HOME | End: 2020-07-02
Payer: COMMERCIAL

## 2020-07-02 DIAGNOSIS — J45.40 MODERATE PERSISTENT REACTIVE AIRWAY DISEASE WITHOUT COMPLICATION: ICD-10-CM

## 2020-07-02 LAB
25(OH)D3+25(OH)D2 SERPL-MCNC: 38.2 NG/ML (ref 30–100)
ALBUMIN SERPL-MCNC: 4 G/DL (ref 3.8–4.8)
ALBUMIN/GLOB SERPL: 1.6 {RATIO} (ref 1.2–2.2)
ALP SERPL-CCNC: 86 IU/L (ref 39–117)
ALT SERPL-CCNC: 23 IU/L (ref 0–32)
AST SERPL-CCNC: 21 IU/L (ref 0–40)
BILIRUB SERPL-MCNC: 0.5 MG/DL (ref 0–1.2)
BUN SERPL-MCNC: 9 MG/DL (ref 8–27)
BUN/CREAT SERPL: 14 (ref 12–28)
CALCIUM SERPL-MCNC: 9.2 MG/DL (ref 8.7–10.3)
CHLORIDE SERPL-SCNC: 95 MMOL/L (ref 96–106)
CHOLEST SERPL-MCNC: 154 MG/DL (ref 100–199)
CO2 SERPL-SCNC: 29 MMOL/L (ref 20–29)
CREAT SERPL-MCNC: 0.65 MG/DL (ref 0.57–1)
EST. AVERAGE GLUCOSE BLD GHB EST-MCNC: 120 MG/DL
GLOBULIN SER CALC-MCNC: 2.5 G/DL (ref 1.5–4.5)
GLUCOSE SERPL-MCNC: 85 MG/DL (ref 65–99)
HBA1C MFR BLD: 5.8 % (ref 4.8–5.6)
HDLC SERPL-MCNC: 55 MG/DL
INTERPRETATION, 910389: NORMAL
LDLC SERPL CALC-MCNC: 76 MG/DL (ref 0–99)
POTASSIUM SERPL-SCNC: 3.8 MMOL/L (ref 3.5–5.2)
PROT SERPL-MCNC: 6.5 G/DL (ref 6–8.5)
SODIUM SERPL-SCNC: 137 MMOL/L (ref 134–144)
T4 FREE SERPL-MCNC: 0.96 NG/DL (ref 0.82–1.77)
TRIGL SERPL-MCNC: 117 MG/DL (ref 0–149)
TSH SERPL DL<=0.005 MIU/L-ACNC: 1.86 UIU/ML (ref 0.45–4.5)
VLDLC SERPL CALC-MCNC: 23 MG/DL (ref 5–40)

## 2020-07-02 PROCEDURE — 71046 X-RAY EXAM CHEST 2 VIEWS: CPT

## 2020-07-06 PROBLEM — M79.644 BILATERAL THUMB PAIN: Status: ACTIVE | Noted: 2020-07-06

## 2020-07-06 PROBLEM — M79.645 BILATERAL THUMB PAIN: Status: ACTIVE | Noted: 2020-07-06

## 2020-07-07 NOTE — PROGRESS NOTES
Call placed to patient. Name and  verified. Reviewed recent test results with patient.  She expressed understanidng

## 2020-08-11 NOTE — TELEPHONE ENCOUNTER
MD Mónica Coleman. Patient call requesting new rx. No information provided for sig and prn reason missing. Please review. Thanks, Vick Nagy    Last Visit: 7/1/20  MD Mónica Coleman  Next Appointment: Not scheduled  Previous Refill Encounter(s): 2/21/20 Historical provider    Requested Prescriptions     Pending Prescriptions Disp Refills    meloxicam (MOBIC) 15 mg tablet 30 Tab 2     Sig: Take 1 Tab by mouth as needed for Pain.

## 2020-08-12 RX ORDER — MELOXICAM 15 MG/1
15 TABLET ORAL
Qty: 30 TAB | Refills: 0 | Status: SHIPPED | OUTPATIENT
Start: 2020-08-12 | End: 2021-03-25 | Stop reason: SDUPTHER

## 2020-09-01 RX ORDER — POTASSIUM CHLORIDE 20 MEQ/1
40 TABLET, EXTENDED RELEASE ORAL DAILY
Qty: 60 TAB | Refills: 2 | Status: SHIPPED | OUTPATIENT
Start: 2020-09-01 | End: 2020-12-02 | Stop reason: SDUPTHER

## 2020-09-01 NOTE — TELEPHONE ENCOUNTER
Last Visit: 7/1/20  MD Kandy Crews  Next Appointment: Not scheduled  Previous Refill Encounter(s): 4/8/20  60 + 2    Requested Prescriptions     Pending Prescriptions Disp Refills    potassium chloride (K-DUR, KLOR-CON) 20 mEq tablet 60 Tab 2     Sig: Take 2 Tabs by mouth daily.

## 2020-09-28 RX ORDER — ATENOLOL AND CHLORTHALIDONE TABLET 50; 25 MG/1; MG/1
0.5 TABLET ORAL DAILY
Qty: 90 TAB | Refills: 0 | Status: SHIPPED | OUTPATIENT
Start: 2020-09-28 | End: 2021-03-11 | Stop reason: SDUPTHER

## 2020-09-28 NOTE — TELEPHONE ENCOUNTER
Last Visit: 7/1/20  MD Carmen Edwards  Next Appointment: Not scheduled- to return 1/1/21  Previous Refill Encounter(s): 4/6/20  90     Requested Prescriptions     Pending Prescriptions Disp Refills    atenoloL-chlorthalidone (TENORETIC) 50-25 mg per tablet 90 Tab 0     Sig: Take 0.5 Tabs by mouth daily.

## 2020-12-02 RX ORDER — POTASSIUM CHLORIDE 20 MEQ/1
40 TABLET, EXTENDED RELEASE ORAL DAILY
Qty: 60 TAB | Refills: 2 | Status: SHIPPED | OUTPATIENT
Start: 2020-12-02 | End: 2021-02-27 | Stop reason: SDUPTHER

## 2020-12-02 NOTE — TELEPHONE ENCOUNTER
MD Juana Green,    Patient call stating she was refused the potassium prescription at the pharmacy and not sure why. I have not had any requests. She does need new rx. Thanks, Kecia Bateman    Last Visit: 7/1/20  MD Juana Green  Next Appointment: Not scheduled- due 1/2021  Previous Refill Encounter(s): 9/1/20  60 + 2    Requested Prescriptions     Pending Prescriptions Disp Refills    potassium chloride (K-DUR, KLOR-CON) 20 mEq tablet 60 Tab 2     Sig: Take 2 Tabs by mouth daily.

## 2021-01-02 DIAGNOSIS — E78.00 HYPERCHOLESTEREMIA: ICD-10-CM

## 2021-01-02 RX ORDER — SIMVASTATIN 40 MG/1
40 TABLET, FILM COATED ORAL
Qty: 90 TAB | Refills: 0 | Status: CANCELLED | OUTPATIENT
Start: 2021-01-02

## 2021-01-05 DIAGNOSIS — E78.00 HYPERCHOLESTEREMIA: ICD-10-CM

## 2021-01-05 NOTE — TELEPHONE ENCOUNTER
MD Jorge Gonzales,     Patient call and had requested thru mychart for refill of simvastatin. Patient is now out. Thanks, José Antonio    Last Visit: 7/1/20  MD Jorge Gonzales, labs done  Next Appointment: Not scheduled  Previous Refill Encounter(s): 4/6/20 90    Requested Prescriptions     Pending Prescriptions Disp Refills    simvastatin (ZOCOR) 40 mg tablet 90 Tab 1     Sig: Take 1 Tab by mouth nightly.

## 2021-01-06 RX ORDER — SIMVASTATIN 40 MG/1
40 TABLET, FILM COATED ORAL
Qty: 90 TAB | Refills: 1 | Status: SHIPPED | OUTPATIENT
Start: 2021-01-06 | End: 2021-04-05 | Stop reason: SDUPTHER

## 2021-03-01 RX ORDER — POTASSIUM CHLORIDE 20 MEQ/1
40 TABLET, EXTENDED RELEASE ORAL DAILY
Qty: 60 TAB | Refills: 0 | Status: SHIPPED | OUTPATIENT
Start: 2021-03-01 | End: 2021-04-01 | Stop reason: SDUPTHER

## 2021-03-01 NOTE — TELEPHONE ENCOUNTER
LOV was 7/1/20. Sent pt Net[employer] message letting her know that we could do a 30 day supply to cover appt. Advised that we could do a virtual visit.

## 2021-03-03 ENCOUNTER — E-VISIT (OUTPATIENT)
Dept: FAMILY MEDICINE CLINIC | Age: 61
End: 2021-03-03

## 2021-03-04 NOTE — TELEPHONE ENCOUNTER
Please let patient know that she will need virtual visit or office visit, not evisit for HTN follow up. No LOS for this encounter as patient is being asked to schedule virtual visit or office visit.      Teja Green MD

## 2021-03-11 ENCOUNTER — VIRTUAL VISIT (OUTPATIENT)
Dept: FAMILY MEDICINE CLINIC | Age: 61
End: 2021-03-11
Payer: COMMERCIAL

## 2021-03-11 ENCOUNTER — LAB ONLY (OUTPATIENT)
Dept: FAMILY MEDICINE CLINIC | Age: 61
End: 2021-03-11

## 2021-03-11 DIAGNOSIS — R73.03 PREDIABETES: ICD-10-CM

## 2021-03-11 DIAGNOSIS — E55.9 VITAMIN D DEFICIENCY: ICD-10-CM

## 2021-03-11 DIAGNOSIS — M79.644 BILATERAL THUMB PAIN: ICD-10-CM

## 2021-03-11 DIAGNOSIS — M79.645 BILATERAL THUMB PAIN: ICD-10-CM

## 2021-03-11 DIAGNOSIS — E78.00 HYPERCHOLESTEREMIA: ICD-10-CM

## 2021-03-11 DIAGNOSIS — I10 ESSENTIAL HYPERTENSION: ICD-10-CM

## 2021-03-11 DIAGNOSIS — R05.3 CHRONIC COUGH: Primary | ICD-10-CM

## 2021-03-11 PROCEDURE — 99213 OFFICE O/P EST LOW 20 MIN: CPT | Performed by: FAMILY MEDICINE

## 2021-03-11 RX ORDER — CICLESONIDE 160 UG/1
2 AEROSOL, METERED RESPIRATORY (INHALATION) DAILY
COMMUNITY
Start: 2021-02-24 | End: 2021-07-06

## 2021-03-11 RX ORDER — ATENOLOL AND CHLORTHALIDONE TABLET 50; 25 MG/1; MG/1
0.5 TABLET ORAL DAILY
Qty: 90 TAB | Refills: 1 | Status: SHIPPED | OUTPATIENT
Start: 2021-03-11 | End: 2022-02-07 | Stop reason: SDUPTHER

## 2021-03-11 NOTE — PROGRESS NOTES
Mercedez Wellington  56 y.o. female  1960  [address] [facility]  [id number]     [address] [facility]       Encounter Date: 3/11/2021           Established Patient Visit Note: Juanpablo Kumar MD    Reason for Appointment:  Chief Complaint   Patient presents with    Follow-up       History of Present Illness:  History provided by patient    Mercedez Wellington is a 56 y.o. female who presents today for:    Patient had dark spot removed on left side of face by dermatologist (Dr. Grady Wiley)  Prediabetes: last a1c 5.8 in July, 2020; has cut back on sweets and has been walking  HTN: home BP has been around 580 systolic  Chronic Cough: saw allergist who did testing and found allergies, she was placed on prednisone, but it did not go away completely. She started Alvesco.   HLD: takes Zocor, last lipids July, 2020 with Chol 154, HDL 55, LDL 76  Thumb Pain: comes and goes, would like referral to a different specialist    Review of Systems  Review of Systems   Constitutional: Negative for chills and fever. Respiratory: Negative for cough, shortness of breath and wheezing. Cardiovascular: Negative for chest pain and palpitations. Allergies: Pcn [penicillins]    Medications: (Updated to reflect final medication list after visit)    Current Outpatient Medications:     atenoloL-chlorthalidone (TENORETIC) 50-25 mg per tablet, Take 0.5 Tabs by mouth daily. , Disp: 90 Tab, Rfl: 1    Alvesco 160 mcg/actuation HFAA, Take 2 Puffs by mouth daily. , Disp: , Rfl:     potassium chloride (K-DUR, KLOR-CON) 20 mEq tablet, Take 2 Tabs by mouth daily. , Disp: 60 Tab, Rfl: 0    simvastatin (ZOCOR) 40 mg tablet, Take 1 Tab by mouth nightly., Disp: 90 Tab, Rfl: 1    meloxicam (MOBIC) 15 mg tablet, Take 1 Tab by mouth daily as needed for Pain., Disp: 30 Tab, Rfl: 0    hydrOXYzine HCL (ATARAX) 25 mg tablet, Take 1 Tab by mouth three (3) times daily as needed for Itching., Disp: 90 Tab, Rfl: 0   Flaxseed Oil oil, by Does Not Apply route., Disp: , Rfl:     cranberry extract (CRAN-MAX) 500 mg cap capsule, Take 500 mg by mouth., Disp: , Rfl:     B.infantis-B.ani-B.long-B.bifi (PROBIOTIC 4X) 10-15 mg TbEC, Take  by mouth., Disp: , Rfl:     cholecalciferol (VITAMIN D3) (2,000 UNITS /50 MCG) cap capsule, Take  by mouth two (2) times a day., Disp: , Rfl:     History  Patient Care Team:  Roberta Graves MD as PCP - General (Family Medicine)  Roberta Graves MD as PCP - [facility]  Aidan Mcdaniels DO as Physician (Obstetrics & Gynecology)  Crista Gowers, MD as Physician (Dermatology)    Past Medical History: she has a past medical history of BMI 39.0-39.9,adult (1/20/2020), Essential hypertension (1/20/2020), Hypercholesteremia (1/20/2020), Severe obesity ([address] (1/20/2020), and Vitamin D deficiency (1/20/2020). Past Surgical History: she has a past surgical history that includes hx hysterectomy; hx shoulder arthroscopy (Right); and hx knee arthroscopy (Left). Family Medical History: family history includes Cancer in her father and mother. Social History: she reports that she has never smoked. She has never used smokeless tobacco. She reports previous alcohol use. She reports that she does not use drugs. Objective:     Physical Exam    Constitutional:       General: Not in acute distress. Appearance: Normal appearance. Not ill-appearing,  Not toxic-appearing. HENT:      Head: Normocephalic. Right Ear: External ear normal.      Left Ear: External ear normal.      Nose: Nose normal.   Eyes:      General: No scleral icterus. Right eye: No discharge. Left eye: No discharge. Extraocular Movements: Extraocular movements intact. Conjunctiva/sclera: Conjunctivae normal.   Neck:      Musculoskeletal: Normal range of motion. Pulmonary:      Effort: Pulmonary effort is normal. No respiratory distress.    Neurological:      Mental Status: Alert and oriented to person, place, and time. Mental status is at baseline. Psychiatric:         Mood and Affect: Mood normal.         Behavior: Behavior normal.         Thought Content: Thought content normal.         Judgment: Judgment normal.       Assessment & Plan:      ICD-10-CM ICD-9-CM    1. Chronic cough  R05 786.2 REFERRAL TO PULMONARY DISEASE   2. Bilateral thumb pain  M79.644 729.5 REFERRAL TO ORTHOPEDICS    M79.645     3. Prediabetes  R73.03 790.29 HEMOGLOBIN A1C WITH EAG   4. Hypercholesteremia  E78.00 272.0    5. Essential hypertension  K61 754.3 METABOLIC PANEL, COMPREHENSIVE      CBC W/O DIFF      atenoloL-chlorthalidone (TENORETIC) 50-25 mg per tablet   6. Vitamin D deficiency  E55.9 268.9 VITAMIN D, 25 HYDROXY     · Chronic cough: Chronic, uncontrolled. Referral to pulmonology. · Thumb Pain: Chronic, uncontrolled. Referral to orthopedics.  All other conditions listed above: chronic and stable. Will continue current treatment regimen. Will check labs as reflected above. Discussed following up with specialist as scheduled. Discussed recommendations for diet and exercise. I was out of the office while conducting this encounter. Consent:  She and/or her healthcare decision maker is aware that this patient-initiated Telehealth encounter is a billable service, with coverage as determined by her insurance carrier. She is aware that she may receive a bill and has provided verbal consent to proceed: Yes    This virtual visit was conducted via [employer]. Pursuant to the emergency declaration under the [facility], [facility]5 waiver authority and the [employer] Act, this Virtual  Visit was conducted to reduce the patient's risk of exposure to COVID-19 and provide continuity of care for an established patient.   Services were provided through a video synchronous discussion virtually to substitute for in-person clinic visit. Due to this being a TeleHealth evaluation, many elements of the physical examination are unable to be assessed. Total Time: minutes: 21-30 minutes. I have discussed the diagnosis with the patient and the intended plan as seen in the above orders. The patient has received an after-visit summary along with patient information handout. I have discussed medication side effects and warnings with the patient as well. Disposition  Follow-up and Dispositions    · Return in about 3 months (around 6/11/2021).            Fazal Espinoza MD

## 2021-03-12 LAB
25(OH)D3 SERPL-MCNC: 45.8 NG/ML (ref 30–100)
ALBUMIN SERPL-MCNC: 3.5 G/DL (ref 3.5–5)
ALBUMIN/GLOB SERPL: 1.1 {RATIO} (ref 1.1–2.2)
ALP SERPL-CCNC: 74 U/L (ref 45–117)
ALT SERPL-CCNC: 24 U/L (ref 12–78)
ANION GAP SERPL CALC-SCNC: 6 MMOL/L (ref 5–15)
AST SERPL-CCNC: 18 U/L (ref 15–37)
BILIRUB SERPL-MCNC: 0.4 MG/DL (ref 0.2–1)
BUN SERPL-MCNC: 19 MG/DL (ref 6–20)
BUN/CREAT SERPL: 23 (ref 12–20)
CALCIUM SERPL-MCNC: 9.2 MG/DL (ref 8.5–10.1)
CHLORIDE SERPL-SCNC: 102 MMOL/L (ref 97–108)
CO2 SERPL-SCNC: 32 MMOL/L (ref 21–32)
CREAT SERPL-MCNC: 0.84 MG/DL (ref 0.55–1.02)
ERYTHROCYTE [DISTWIDTH] IN BLOOD BY AUTOMATED COUNT: 13.2 % (ref 11.5–14.5)
EST. AVERAGE GLUCOSE BLD GHB EST-MCNC: 117 MG/DL
GLOBULIN SER CALC-MCNC: 3.1 G/DL (ref 2–4)
GLUCOSE SERPL-MCNC: 92 MG/DL (ref 65–100)
HBA1C MFR BLD: 5.7 % (ref 4–5.6)
HCT VFR BLD AUTO: 41.8 % (ref 35–47)
HGB BLD-MCNC: 13.5 G/DL (ref 11.5–16)
MCH RBC QN AUTO: 29.7 PG (ref 26–34)
MCHC RBC AUTO-ENTMCNC: 32.3 G/DL (ref 30–36.5)
MCV RBC AUTO: 92.1 FL (ref 80–99)
NRBC # BLD: 0 K/UL (ref 0–0.01)
NRBC BLD-RTO: 0 PER 100 WBC
PLATELET # BLD AUTO: 259 K/UL (ref 150–400)
PMV BLD AUTO: 10.5 FL (ref 8.9–12.9)
POTASSIUM SERPL-SCNC: 3.5 MMOL/L (ref 3.5–5.1)
PROT SERPL-MCNC: 6.6 G/DL (ref 6.4–8.2)
RBC # BLD AUTO: 4.54 M/UL (ref 3.8–5.2)
SODIUM SERPL-SCNC: 140 MMOL/L (ref 136–145)
WBC # BLD AUTO: 7.8 K/UL (ref 3.6–11)

## 2021-03-25 RX ORDER — MELOXICAM 15 MG/1
15 TABLET ORAL
Qty: 30 TAB | Refills: 0 | Status: SHIPPED | OUTPATIENT
Start: 2021-03-25 | End: 2021-07-06

## 2021-03-25 NOTE — TELEPHONE ENCOUNTER
Patient is requesting refill of meloxicam.  Thanks, Ellie    Last Visit: VV 3/11/21 MD Nova Constantino  Next Appointment: 7/6/21 MD Nova Constantino  Previous Refill Encounter(s): 8/12/20 30    Requested Prescriptions     Pending Prescriptions Disp Refills    meloxicam (MOBIC) 15 mg tablet 30 Tab 0     Sig: Take 1 Tab by mouth daily as needed for Pain.

## 2021-04-01 RX ORDER — POTASSIUM CHLORIDE 20 MEQ/1
40 TABLET, EXTENDED RELEASE ORAL DAILY
Qty: 60 TAB | Refills: 5 | Status: SHIPPED | OUTPATIENT
Start: 2021-04-01 | End: 2021-09-28 | Stop reason: SDUPTHER

## 2021-04-01 NOTE — TELEPHONE ENCOUNTER
MD Nesha Pratt,    Patient call stating she has 1 day left of potassium. Needs asap. Thanks, Ellie    Last Visit: VV 3/11/21 MD Nesha Pratt  Next Appointment: 7/6/21 MD Nesha Pratt  Previous Refill Encounter(s): 3/1/21 60    Requested Prescriptions     Pending Prescriptions Disp Refills    potassium chloride (K-DUR, KLOR-CON) 20 mEq tablet 60 Tab 5     Sig: Take 2 Tabs by mouth daily.

## 2021-04-05 DIAGNOSIS — E78.00 HYPERCHOLESTEREMIA: ICD-10-CM

## 2021-04-05 RX ORDER — SIMVASTATIN 40 MG/1
40 TABLET, FILM COATED ORAL
Qty: 90 TAB | Refills: 1 | Status: SHIPPED | OUTPATIENT
Start: 2021-04-05 | End: 2022-01-03 | Stop reason: SDUPTHER

## 2021-04-05 NOTE — TELEPHONE ENCOUNTER
MD Nelwyn Severe is requesting refill 90 d/s of simvastatin for patient. I show rx has refill but they state it has no refills on 1/6/21 rx. Last Visit: VV 3/11/21 MD Ghazal Pineda, lipid lab 7/2020  Next Appointment: 7/6/21 MD Ghazal Pineda  Previous Refill Encounter(s): 1/6/21 90    Requested Prescriptions     Pending Prescriptions Disp Refills    simvastatin (ZOCOR) 40 mg tablet 90 Tab 1     Sig: Take 1 Tab by mouth nightly.

## 2021-04-16 ENCOUNTER — TRANSCRIBE ORDER (OUTPATIENT)
Dept: GENERAL RADIOLOGY | Age: 61
End: 2021-04-16

## 2021-04-16 ENCOUNTER — HOSPITAL ENCOUNTER (OUTPATIENT)
Dept: GENERAL RADIOLOGY | Age: 61
Discharge: HOME | End: 2021-04-16
Payer: COMMERCIAL

## 2021-04-16 DIAGNOSIS — R05.9 COUGH: ICD-10-CM

## 2021-04-16 DIAGNOSIS — R05.9 COUGH: Primary | ICD-10-CM

## 2021-04-16 PROCEDURE — 71046 X-RAY EXAM CHEST 2 VIEWS: CPT

## 2021-07-06 ENCOUNTER — OFFICE VISIT (OUTPATIENT)
Dept: FAMILY MEDICINE CLINIC | Age: 61
End: 2021-07-06
Payer: COMMERCIAL

## 2021-07-06 VITALS
WEIGHT: 193.8 LBS | BODY MASS INDEX: 35.66 KG/M2 | OXYGEN SATURATION: 97 % | DIASTOLIC BLOOD PRESSURE: 82 MMHG | RESPIRATION RATE: 18 BRPM | TEMPERATURE: 97.2 F | SYSTOLIC BLOOD PRESSURE: 110 MMHG | HEIGHT: 62 IN | HEART RATE: 66 BPM

## 2021-07-06 DIAGNOSIS — I10 ESSENTIAL HYPERTENSION: ICD-10-CM

## 2021-07-06 DIAGNOSIS — H53.9 TRANSIENT VISION DISTURBANCE OF BOTH EYES: ICD-10-CM

## 2021-07-06 DIAGNOSIS — R05.3 CHRONIC COUGH: ICD-10-CM

## 2021-07-06 DIAGNOSIS — M79.644 BILATERAL THUMB PAIN: ICD-10-CM

## 2021-07-06 DIAGNOSIS — J30.1 SEASONAL ALLERGIC RHINITIS DUE TO POLLEN: ICD-10-CM

## 2021-07-06 DIAGNOSIS — M79.645 BILATERAL THUMB PAIN: ICD-10-CM

## 2021-07-06 DIAGNOSIS — E78.00 HYPERCHOLESTEREMIA: Primary | ICD-10-CM

## 2021-07-06 DIAGNOSIS — E55.9 VITAMIN D DEFICIENCY: ICD-10-CM

## 2021-07-06 DIAGNOSIS — R73.03 PREDIABETES: ICD-10-CM

## 2021-07-06 LAB
25(OH)D3 SERPL-MCNC: 54.1 NG/ML (ref 30–100)
ALBUMIN SERPL-MCNC: 3.7 G/DL (ref 3.5–5)
ALBUMIN/GLOB SERPL: 1.2 {RATIO} (ref 1.1–2.2)
ALP SERPL-CCNC: 79 U/L (ref 45–117)
ALT SERPL-CCNC: 36 U/L (ref 12–78)
ANION GAP SERPL CALC-SCNC: 2 MMOL/L (ref 5–15)
AST SERPL-CCNC: 26 U/L (ref 15–37)
BILIRUB SERPL-MCNC: 0.5 MG/DL (ref 0.2–1)
BUN SERPL-MCNC: 12 MG/DL (ref 6–20)
BUN/CREAT SERPL: 18 (ref 12–20)
CALCIUM SERPL-MCNC: 9.4 MG/DL (ref 8.5–10.1)
CHLORIDE SERPL-SCNC: 103 MMOL/L (ref 97–108)
CHOLEST SERPL-MCNC: 162 MG/DL
CO2 SERPL-SCNC: 34 MMOL/L (ref 21–32)
CREAT SERPL-MCNC: 0.67 MG/DL (ref 0.55–1.02)
ERYTHROCYTE [DISTWIDTH] IN BLOOD BY AUTOMATED COUNT: 13.3 % (ref 11.5–14.5)
EST. AVERAGE GLUCOSE BLD GHB EST-MCNC: 114 MG/DL
GLOBULIN SER CALC-MCNC: 3.2 G/DL (ref 2–4)
GLUCOSE SERPL-MCNC: 102 MG/DL (ref 65–100)
HBA1C MFR BLD: 5.6 % (ref 4–5.6)
HCT VFR BLD AUTO: 43.4 % (ref 35–47)
HDLC SERPL-MCNC: 60 MG/DL
HDLC SERPL: 2.7 {RATIO} (ref 0–5)
HGB BLD-MCNC: 13.8 G/DL (ref 11.5–16)
LDLC SERPL CALC-MCNC: 77.8 MG/DL (ref 0–100)
MCH RBC QN AUTO: 29.4 PG (ref 26–34)
MCHC RBC AUTO-ENTMCNC: 31.8 G/DL (ref 30–36.5)
MCV RBC AUTO: 92.5 FL (ref 80–99)
NRBC # BLD: 0 K/UL (ref 0–0.01)
NRBC BLD-RTO: 0 PER 100 WBC
PLATELET # BLD AUTO: 246 K/UL (ref 150–400)
PMV BLD AUTO: 10.4 FL (ref 8.9–12.9)
POTASSIUM SERPL-SCNC: 4.4 MMOL/L (ref 3.5–5.1)
PROT SERPL-MCNC: 6.9 G/DL (ref 6.4–8.2)
RBC # BLD AUTO: 4.69 M/UL (ref 3.8–5.2)
SODIUM SERPL-SCNC: 139 MMOL/L (ref 136–145)
TRIGL SERPL-MCNC: 121 MG/DL (ref ?–150)
TSH SERPL DL<=0.05 MIU/L-ACNC: 1.04 UIU/ML (ref 0.36–3.74)
VLDLC SERPL CALC-MCNC: 24.2 MG/DL
WBC # BLD AUTO: 6.6 K/UL (ref 3.6–11)

## 2021-07-06 PROCEDURE — 99214 OFFICE O/P EST MOD 30 MIN: CPT | Performed by: FAMILY MEDICINE

## 2021-07-06 RX ORDER — TRETINOIN 0.25 MG/G
CREAM TOPICAL
COMMUNITY
Start: 2021-06-25

## 2021-07-06 NOTE — PROGRESS NOTES
Identified pt with two pt identifiers(name and ). Reviewed record in preparation for visit and have obtained necessary documentation. No chief complaint on file. Vitals:    21 0820   Weight: 193 lb 12.8 oz (87.9 kg)   Height: 5' 1.5\" (1.562 m)   PainSc:   0 - No pain       Health Maintenance Due   Topic    PAP AKA CERVICAL CYTOLOGY     Breast Cancer Screen Mammogram     Lipid Screen        Coordination of Care Questionnaire:  :   1) Have you been to an emergency room, urgent care, or hospitalized since your last visit? If yes, where when, and reason for visit? no       2. Have seen or consulted any other health care provider since your last visit? If yes, where when, and reason for visit? YES yes Dr. Nathaly Anthony 10/20      Patient is accompanied by self I have received verbal consent from Dolores Roberts to discuss any/all medical information while they are present in the room.

## 2021-07-06 NOTE — PROGRESS NOTES
Trey Palomino  56 y.o. female  1960  [address]  [id number]     [address] [facility]       Encounter Date: 7/6/2021           Established Patient Visit Note: Porsche Gaspar MD    Reason for Appointment:  Chief Complaint   Patient presents with    Follow-up       History of Present Illness:  History provided by patient    Trey Palomino is a 56 y.o. female who presents to clinic today for:    Blurred Vision: she reports hat yesterday she had a episode of blurred vision on the lateral aspect of the left and right eye (worse on left) that lasted a few minutes and then went away. She states that it was the lateral aspect of both eyes. She denies any palpiatons or chest pain. She reports that it felt pixalated. She is followed by eye doctor (Dr. Sheila Sawyer at [facility]). She denies any assoicated pain. She denies any weakness, slurring of speech, or dizziness. Chronic Cough: she reports that she saw pulmonollogy with normal CXR and PFTs. She reports that she discovered that she had mold in the house. She had this remiated, and she reports that the cough is improving. She has follow up in Sept, 2021  Allergies: she stopped taking Alvesco  HTN: She reports home BP as 101/59  Prediabetes: working on deit and exercse  Vitamin D Deficiency: taking Vit D 5000 daily  Thumb Pain: she reports that it is about the same. She has not scheduled follow up with the orthopedist      Review of Systems  Review of Systems   Constitutional: Negative for chills and fever. Respiratory: Negative for cough, shortness of breath and wheezing. Cardiovascular: Negative for chest pain and palpitations.           Allergies: Pcn [penicillins]    Medications: (Updated to reflect final medication list after visit)    Current Outpatient Medications:     tretinoin (RETIN-A) 0.025 % topical cream, APPLY CREAM TOPICALLY TO AFFECTED AREA ONCE DAILY AT BEDTIME TO FACE, Disp: , Rfl:   simvastatin (ZOCOR) 40 mg tablet, Take 1 Tab by mouth nightly., Disp: 90 Tab, Rfl: 1    potassium chloride (K-DUR, KLOR-CON) 20 mEq tablet, Take 2 Tabs by mouth daily. , Disp: 60 Tab, Rfl: 5    atenoloL-chlorthalidone (TENORETIC) 50-25 mg per tablet, Take 0.5 Tabs by mouth daily. , Disp: 90 Tab, Rfl: 1    Flaxseed Oil oil, by Does Not Apply route., Disp: , Rfl:     cranberry extract (CRAN-MAX) 500 mg cap capsule, Take 500 mg by mouth., Disp: , Rfl:     B.infantis-B.ani-B.long-B.bifi (PROBIOTIC 4X) 10-15 mg TbEC, Take  by mouth., Disp: , Rfl:     cholecalciferol (VITAMIN D3) (2,000 UNITS /50 MCG) cap capsule, Take  by mouth two (2) times a day., Disp: , Rfl:     History  Patient Care Team:  Lashonda Dyer MD as PCP - General (Family Medicine)  [facility] Dyer [facility]  Pablo Jon DO as Physician (Obstetrics & Gynecology)  Eva Duarte MD as Physician (Dermatology)    Past Medical History: she has a past medical history of BMI 39.0-39.9,adult (1/20/2020), Essential hypertension (1/20/2020), Hypercholesteremia (1/20/2020), Severe obesity ([address] (1/20/2020), and Vitamin D deficiency (1/20/2020). Past Surgical History: she has a past surgical history that includes hx hysterectomy; hx shoulder arthroscopy (Right); and hx knee arthroscopy (Left). Family Medical History: family history includes Cancer in her father and mother. Social History: she reports that she has never smoked. She has never used smokeless tobacco. She reports previous alcohol use. She reports that she does not use drugs.     Health Maintenance Due   Topic Date Due    PAP AKA CERVICAL CYTOLOGY  Never done    Breast Cancer Screen Mammogram  06/07/2021       Objective:   Visit Vitals  /82 (BP 1 Location: Right upper arm, BP Patient Position: Sitting, BP Cuff Size: Large adult)   Pulse 66   Temp 97.2 °F (36.2 °C) (Temporal)   Resp 18   Ht 5' 1.5\" (1.562 m)   Wt 193 lb 12.8 oz (87.9 kg)   SpO2 97%   BMI 36.03 kg/m²     Wt Readings from Last 3 Encounters:   07/06/21 193 lb 12.8 oz (87.9 kg)   07/01/20 205 lb 6.4 oz (93.2 kg)   01/20/20 212 lb 12.8 oz (96.5 kg)       Physical Exam  Vitals and nursing note reviewed. Constitutional:       General: She is not in acute distress. Appearance: Normal appearance. HENT:      Head: Normocephalic and atraumatic. Nose: Nose normal.      Mouth/Throat:      Mouth: Mucous membranes are moist.   Eyes:      Extraocular Movements: Extraocular movements intact. Conjunctiva/sclera: Conjunctivae normal.      Pupils: Pupils are equal, round, and reactive to light. Cardiovascular:      Rate and Rhythm: Normal rate and regular rhythm. Pulses: Normal pulses. Heart sounds: Normal heart sounds. No murmur heard. No friction rub. No gallop. Pulmonary:      Effort: Pulmonary effort is normal. No respiratory distress. Breath sounds: Normal breath sounds. No wheezing, rhonchi or rales. Musculoskeletal:         General: Normal range of motion. Cervical back: Normal range of motion and neck supple. No rigidity. No muscular tenderness. Lymphadenopathy:      Cervical: No cervical adenopathy. Skin:     General: Skin is warm. Coloration: Skin is not jaundiced. Neurological:      General: No focal deficit present. Mental Status: She is alert. Mental status is at baseline. Cranial Nerves: Cranial nerves are intact. Motor: Motor function is intact. Coordination: Coordination is intact. Psychiatric:         Mood and Affect: Mood normal.         Behavior: Behavior normal.         Thought Content: Thought content normal.         Judgment: Judgment normal.       Vision: 20:25 left and right    Assessment & Plan:      ICD-10-CM ICD-9-CM    1.  Hypercholesteremia  E78.00 272.0 LIPID PANEL      LIPID PANEL   2. Vitamin D deficiency  E55.9 268.9 VITAMIN D, 25 HYDROXY      VITAMIN D, 25 HYDROXY   3. Essential hypertension  I10 401.9 CBC W/O DIFF      METABOLIC PANEL, COMPREHENSIVE      TSH 3RD GENERATION      TSH 3RD GENERATION      METABOLIC PANEL, COMPREHENSIVE      CBC W/O DIFF   4. Prediabetes  R73.03 790.29 HEMOGLOBIN A1C WITH EAG      HEMOGLOBIN A1C WITH EAG   5. Transient vision disturbance of both eyes  H53.9 368.9 MRI BRAIN WO CONT      DUPLEX CAROTID BILATERAL      REFERRAL TO OPHTHALMOLOGY   6. Chronic cough  R05 786.2    7. Bilateral thumb pain  M79.644 729.5     M79.645     8. Seasonal allergic rhinitis due to pollen  J30.1 477.0      · Vision Disturbance: New problem, unclear etiology. Obtain MRI and carotid duplex. Referral to ophthalmology for further evaluation. Discussed red flag symptoms and reasons to call or go to ED   All other conditions listed above: chronic and stable. Will continue current treatment regimen. Will check labs as reflected above. Discussed following up with specialist as scheduled. I have discussed the diagnosis with the patient and the intended plan as seen in the above orders. The patient has received an after-visit summary along with patient information handout. I have discussed medication side effects and warnings with the patient as well. Disposition  Follow-up and Dispositions    · Return in about 2 weeks (around 7/20/2021).            Immanuel Campos MD

## 2021-07-22 ENCOUNTER — HOSPITAL ENCOUNTER (OUTPATIENT)
Dept: ULTRASOUND IMAGING | Age: 61
Discharge: HOME | End: 2021-07-22
Attending: FAMILY MEDICINE
Payer: COMMERCIAL

## 2021-07-22 ENCOUNTER — HOSPITAL ENCOUNTER (OUTPATIENT)
Dept: MRI IMAGING | Age: 61
Discharge: HOME | End: 2021-07-22
Attending: FAMILY MEDICINE
Payer: COMMERCIAL

## 2021-07-22 DIAGNOSIS — H53.9 TRANSIENT VISION DISTURBANCE OF BOTH EYES: ICD-10-CM

## 2021-07-22 LAB
LEFT ICA/CCA SYS: 1
RIGHT ICA/CCA SYS: 1.3

## 2021-07-22 PROCEDURE — 93880 EXTRACRANIAL BILAT STUDY: CPT

## 2021-07-27 ENCOUNTER — TELEPHONE (OUTPATIENT)
Dept: FAMILY MEDICINE CLINIC | Age: 61
End: 2021-07-27

## 2021-07-27 DIAGNOSIS — F41.8 SITUATIONAL ANXIETY: Primary | ICD-10-CM

## 2021-07-27 RX ORDER — ALPRAZOLAM 0.25 MG/1
TABLET ORAL
Qty: 2 TABLET | Refills: 0 | Status: SHIPPED | OUTPATIENT
Start: 2021-07-27 | End: 2021-08-04

## 2021-07-27 NOTE — TELEPHONE ENCOUNTER
----- Message from Vonnie Goodpasture. Bender sent at 7/26/2021  5:44 PM EDT -----  Regarding: RE: Prescription Question  Contact: [phone]  I am following up on my reply on Friday that I have not taken a sedative before. I need this by Wednesday for an MRI that I have scheduled.     Thanks,  Andrew Bradley

## 2021-07-27 NOTE — TELEPHONE ENCOUNTER
Attempted to call patient. No answer. Left VM. Please let patient know that I sent Xanax to her pharmacy. She should have someone driving her if she uses the Xanax.      Claudean Latch, MD

## 2021-07-28 ENCOUNTER — HOSPITAL ENCOUNTER (OUTPATIENT)
Dept: MRI IMAGING | Age: 61
Discharge: HOME | End: 2021-07-28
Attending: FAMILY MEDICINE
Payer: COMMERCIAL

## 2021-07-28 PROCEDURE — 70551 MRI BRAIN STEM W/O DYE: CPT

## 2021-08-04 ENCOUNTER — OFFICE VISIT (OUTPATIENT)
Dept: FAMILY MEDICINE CLINIC | Age: 61
End: 2021-08-04
Payer: COMMERCIAL

## 2021-08-04 VITALS
HEIGHT: 61 IN | TEMPERATURE: 98.2 F | WEIGHT: 193 LBS | BODY MASS INDEX: 36.44 KG/M2 | HEART RATE: 64 BPM | SYSTOLIC BLOOD PRESSURE: 117 MMHG | DIASTOLIC BLOOD PRESSURE: 78 MMHG | OXYGEN SATURATION: 97 % | RESPIRATION RATE: 18 BRPM

## 2021-08-04 DIAGNOSIS — R73.03 PREDIABETES: ICD-10-CM

## 2021-08-04 DIAGNOSIS — R05.3 CHRONIC COUGH: ICD-10-CM

## 2021-08-04 DIAGNOSIS — H53.9 TRANSIENT VISION DISTURBANCE OF BOTH EYES: Primary | ICD-10-CM

## 2021-08-04 DIAGNOSIS — M79.644 BILATERAL THUMB PAIN: ICD-10-CM

## 2021-08-04 DIAGNOSIS — M79.645 BILATERAL THUMB PAIN: ICD-10-CM

## 2021-08-04 DIAGNOSIS — E55.9 VITAMIN D DEFICIENCY: ICD-10-CM

## 2021-08-04 DIAGNOSIS — I10 ESSENTIAL HYPERTENSION: ICD-10-CM

## 2021-08-04 PROCEDURE — 99213 OFFICE O/P EST LOW 20 MIN: CPT | Performed by: FAMILY MEDICINE

## 2021-08-04 RX ORDER — CICLESONIDE 160 UG/1
AEROSOL, METERED RESPIRATORY (INHALATION)
COMMUNITY
Start: 2021-07-13

## 2021-08-04 NOTE — PROGRESS NOTES
Chief Complaint   Patient presents with    Follow-up     1. Have you been to the ER, urgent care clinic since your last visit? Hospitalized since your last visit? No    2. Have you seen or consulted any other health care providers outside of the [address] [place] [address] since your last visit? Include any pap smears or colon screening.  No

## 2021-08-04 NOTE — PROGRESS NOTES
Kolton Pete  56 y.o. female  1960  [address] [facility]  [id number]     [address] [facility]       Encounter Date: 8/4/2021           Established Patient Visit Note: Tae Pena MD    Reason for Appointment:  Chief Complaint   Patient presents with    Follow Up Chronic Condition     cough     Cholesterol Problem    Hypertension       History of Present Illness:  History provided by patient    Kolton Pete is a 56 y.o. female who presents to clinic today for:    · Vision change: She reports that she saw the ophthalmologist (Dr Ivy Díaz) who did exam showing healthy eyes. He was concerned for ocular migraine as cause of symptoms. She had MRI that did not show any acute intracranial process. She had carotid duplex showing carotid bulb intimal thickening and calcific plaque. · Chronic Cough: had reports that she had negative CXR and PFTs; she has follow up with pulmonology in [address] [place] [address], 2021. She believes it to be related to mold in the house, and she had the house treated. She has wedding at end of this month, and she does not want to be coughing for that. She is taking Alvesco, which she reports has improved her cough. · HTN: not checking BP at home  · Prediabetes: continuing to work on diet and exercise  · Vitamin D Deficiency: last Vit D 54.1 on 7/6/2; taking 5000 daily of Vit d  · Thumb Pain: she has not yet scheduled follow up with ortho  · She reports that she will be having root canal on Friday      Review of Systems  Review of Systems   Constitutional: Negative for chills and fever. Respiratory: Negative for cough, shortness of breath and wheezing. Cardiovascular: Negative for chest pain and palpitations.           Allergies: Pcn [penicillins]    Medications: (Updated to reflect final medication list after visit)    Current Outpatient Medications:     Alvesco 160 mcg/actuation HFAA, INHALE TWO PUFFS BY MOUTH TWICE DAILY, Disp: , Rfl:     MELOXICAM PO, Take  by mouth., Disp: , Rfl:     tretinoin (RETIN-A) 0.025 % topical cream, APPLY CREAM TOPICALLY TO AFFECTED AREA ONCE DAILY AT BEDTIME TO FACE, Disp: , Rfl:     simvastatin (ZOCOR) 40 mg tablet, Take 1 Tab by mouth nightly., Disp: 90 Tab, Rfl: 1    potassium chloride (K-DUR, KLOR-CON) 20 mEq tablet, Take 2 Tabs by mouth daily. , Disp: 60 Tab, Rfl: 5    atenoloL-chlorthalidone (TENORETIC) 50-25 mg per tablet, Take 0.5 Tabs by mouth daily. , Disp: 90 Tab, Rfl: 1    Flaxseed Oil oil, by Does Not Apply route., Disp: , Rfl:     cranberry extract (CRAN-MAX) 500 mg cap capsule, Take 500 mg by mouth., Disp: , Rfl:     B.infantis-B.ani-B.long-B.bifi (PROBIOTIC 4X) 10-15 mg TbEC, Take  by mouth., Disp: , Rfl:     cholecalciferol (VITAMIN D3) (2,000 UNITS /50 MCG) cap capsule, Take  by mouth two (2) times a day., Disp: , Rfl:     History  Patient Care Team:  Carlo Chang MD as PCP - General (Family Medicine)  Carlo Chang MD as PCP - [facility]  Galo Crow DO as Physician (Obstetrics & Gynecology)  Beatriz Torres MD as Physician (Dermatology)    Past Medical History: she has a past medical history of BMI 39.0-39.9,adult (1/20/2020), Essential hypertension (1/20/2020), Hypercholesteremia (1/20/2020), Severe obesity ([address] (1/20/2020), and Vitamin D deficiency (1/20/2020). Past Surgical History: she has a past surgical history that includes hx hysterectomy; hx shoulder arthroscopy (Right); and hx knee arthroscopy (Left). Family Medical History: family history includes Cancer in her father and mother. Social History: she reports that she has never smoked. She has never used smokeless tobacco. She reports previous alcohol use. She reports that she does not use drugs.     Health Maintenance Due   Topic Date Due    PAP AKA CERVICAL CYTOLOGY  Never done    Breast Cancer Screen Mammogram  06/07/2021       Objective:   Visit Vitals  /78 (BP 1 Location: Left upper arm, BP Patient Position: Sitting, BP Cuff Size: Adult)   Pulse 64   Temp 98.2 °F (36.8 °C) (Temporal)   Resp 18   Ht 5' 1\" (1.549 m)   Wt 193 lb (87.5 kg)   SpO2 97%   BMI 36.47 kg/m²     Wt Readings from Last 3 Encounters:   08/04/21 193 lb (87.5 kg)   07/06/21 193 lb 12.8 oz (87.9 kg)   07/01/20 205 lb 6.4 oz (93.2 kg)       Physical Exam  Vitals and nursing note reviewed. Constitutional:       General: She is not in acute distress. Appearance: Normal appearance. HENT:      Head: Normocephalic and atraumatic. Nose: Nose normal.      Mouth/Throat:      Mouth: Mucous membranes are moist.   Eyes:      Extraocular Movements: Extraocular movements intact. Conjunctiva/sclera: Conjunctivae normal.      Pupils: Pupils are equal, round, and reactive to light. Cardiovascular:      Rate and Rhythm: Normal rate and regular rhythm. Pulses: Normal pulses. Heart sounds: Normal heart sounds. No murmur heard. No friction rub. No gallop. Pulmonary:      Effort: Pulmonary effort is normal. No respiratory distress. Breath sounds: Normal breath sounds. No wheezing, rhonchi or rales. Musculoskeletal:         General: Normal range of motion. Cervical back: Normal range of motion and neck supple. No rigidity. No muscular tenderness. Lymphadenopathy:      Cervical: No cervical adenopathy. Skin:     General: Skin is warm. Coloration: Skin is not jaundiced. Neurological:      General: No focal deficit present. Mental Status: She is alert. Mental status is at baseline. Cranial Nerves: Cranial nerves are intact. Motor: Motor function is intact. Coordination: Coordination is intact. Psychiatric:         Mood and Affect: Mood normal.         Behavior: Behavior normal.         Thought Content: Thought content normal.         Judgment: Judgment normal.         Assessment & Plan:      ICD-10-CM ICD-9-CM    1. Transient vision disturbance of both eyes  H53.9 368.9    2. Chronic cough  R05 786.2    3. Essential hypertension  I10 401.9    4. Prediabetes  R73.03 790.29    5. Vitamin D deficiency  E55.9 268.9    6. Bilateral thumb pain  M79.644 729.5     M79.645       · Transient vision Change: Chronic, improved. Follow up with ophthalmology as scheduled. · Chronic Cough: Chronic, stable. Follow up with pulmonology as scheduled.  All other conditions listed above: chronic and stable. Will continue current treatment regimen. Discussed following up with specialist as scheduled. Discussed recommendations for diet and exercise. I have discussed the diagnosis with the patient and the intended plan as seen in the above orders. The patient has received an after-visit summary along with patient information handout. I have discussed medication side effects and warnings with the patient as well. Disposition  Follow-up and Dispositions    · Return in about 6 months (around 2/4/2022).            Saritha Collier MD

## 2021-09-28 RX ORDER — POTASSIUM CHLORIDE 20 MEQ/1
40 TABLET, EXTENDED RELEASE ORAL DAILY
Qty: 60 TABLET | Refills: 5 | Status: SHIPPED | OUTPATIENT
Start: 2021-09-28 | End: 2021-12-29 | Stop reason: SDUPTHER

## 2021-09-28 NOTE — TELEPHONE ENCOUNTER
MD Ruma Ureña,    Patient call stating [facility] has tried to contact us but none noted. Patient has also sent My chart message,  needs Potassium asap. Will be out today. Thanks, Blaze Savage    Last Visit: 8/4/21 MD Ruma Ureña  Next Appointment: Not scheduled  Previous Refill Encounter(s): 4/1/21 60 + 5    Requested Prescriptions     Pending Prescriptions Disp Refills    potassium chloride (K-DUR, KLOR-CON) 20 mEq tablet 60 Tablet 5     Sig: Take 2 Tablets by mouth daily.

## 2021-09-28 NOTE — TELEPHONE ENCOUNTER
Tried to contact patient to advise the request has been sent to MD Twan Jacobs.   Left message that the order is being processed and sent to MD Twan Jacobs for approval.  Thanks, Toñito Wu

## 2021-09-28 NOTE — TELEPHONE ENCOUNTER
Patient called checking the status of her refill on her potassium she's out completely.     Best call back #[phone]

## 2021-12-21 ENCOUNTER — TELEPHONE (OUTPATIENT)
Dept: FAMILY MEDICINE CLINIC | Age: 61
End: 2021-12-21

## 2021-12-21 NOTE — TELEPHONE ENCOUNTER
Patient called stated she has a cough couple years been going to allergy and pulmonologist.    Requesting a call back    Best call back #[phone]

## 2021-12-23 ENCOUNTER — PATIENT MESSAGE (OUTPATIENT)
Dept: FAMILY MEDICINE CLINIC | Age: 61
End: 2021-12-23

## 2021-12-23 NOTE — TELEPHONE ENCOUNTER
Spoke with Pt and informed her that we are not seeing sick patient in the office and we are unable to just send in an antibiotic.  Pt stated she will go to [facility]

## 2021-12-24 LAB — SARS-COV-2, NAA: NEGATIVE

## 2021-12-29 ENCOUNTER — PATIENT MESSAGE (OUTPATIENT)
Dept: FAMILY MEDICINE CLINIC | Age: 61
End: 2021-12-29

## 2021-12-29 RX ORDER — POTASSIUM CHLORIDE 20 MEQ/1
40 TABLET, EXTENDED RELEASE ORAL DAILY
Qty: 60 TABLET | Refills: 5 | Status: SHIPPED | OUTPATIENT
Start: 2021-12-29 | End: 2022-08-03 | Stop reason: SDUPTHER

## 2022-01-03 DIAGNOSIS — E78.00 HYPERCHOLESTEREMIA: ICD-10-CM

## 2022-01-03 RX ORDER — SIMVASTATIN 40 MG/1
40 TABLET, FILM COATED ORAL
Qty: 90 TABLET | Refills: 1 | Status: SHIPPED | OUTPATIENT
Start: 2022-01-03 | End: 2022-02-07 | Stop reason: SDUPTHER

## 2022-01-03 NOTE — TELEPHONE ENCOUNTER
Last Visit: 8/4/21 MD Adam Daniels, lipid 7/2021  Next Appointment: 2/7/22 MD Adam Daniels  Previous Refill Encounter(s): 4/5/21 90 + 1    Requested Prescriptions     Pending Prescriptions Disp Refills    simvastatin (ZOCOR) 40 mg tablet 90 Tablet 1     Sig: Take 1 Tablet by mouth nightly.

## 2022-01-04 NOTE — TELEPHONE ENCOUNTER
----- Message from Charles Hastings sent at 1/3/2022 11:29 AM EST -----  Regarding: Simvastatin  I am down to my last pill as of today. I really need this prescription filled.   Do you have an estimate of when that might be?

## 2022-02-07 ENCOUNTER — OFFICE VISIT (OUTPATIENT)
Dept: FAMILY MEDICINE CLINIC | Age: 62
End: 2022-02-07
Payer: COMMERCIAL

## 2022-02-07 VITALS
TEMPERATURE: 97.9 F | SYSTOLIC BLOOD PRESSURE: 117 MMHG | DIASTOLIC BLOOD PRESSURE: 80 MMHG | WEIGHT: 195 LBS | HEIGHT: 61 IN | BODY MASS INDEX: 36.82 KG/M2 | HEART RATE: 67 BPM | RESPIRATION RATE: 16 BRPM | OXYGEN SATURATION: 97 %

## 2022-02-07 DIAGNOSIS — R05.3 CHRONIC COUGH: Primary | ICD-10-CM

## 2022-02-07 DIAGNOSIS — H53.8 BLURRY VISION: ICD-10-CM

## 2022-02-07 DIAGNOSIS — M79.645 BILATERAL THUMB PAIN: ICD-10-CM

## 2022-02-07 DIAGNOSIS — E55.9 VITAMIN D DEFICIENCY: ICD-10-CM

## 2022-02-07 DIAGNOSIS — M79.644 BILATERAL THUMB PAIN: ICD-10-CM

## 2022-02-07 DIAGNOSIS — J30.1 SEASONAL ALLERGIC RHINITIS DUE TO POLLEN: ICD-10-CM

## 2022-02-07 DIAGNOSIS — G43.109 OCULAR MIGRAINE: ICD-10-CM

## 2022-02-07 DIAGNOSIS — J30.89 ENVIRONMENTAL AND SEASONAL ALLERGIES: ICD-10-CM

## 2022-02-07 DIAGNOSIS — E78.00 HYPERCHOLESTEREMIA: ICD-10-CM

## 2022-02-07 DIAGNOSIS — I10 ESSENTIAL HYPERTENSION: ICD-10-CM

## 2022-02-07 DIAGNOSIS — R73.02 IGT (IMPAIRED GLUCOSE TOLERANCE): ICD-10-CM

## 2022-02-07 LAB
25(OH)D3 SERPL-MCNC: 54.6 NG/ML (ref 30–100)
ALBUMIN SERPL-MCNC: 3.6 G/DL (ref 3.5–5)
ALBUMIN/GLOB SERPL: 1.1 {RATIO} (ref 1.1–2.2)
ALP SERPL-CCNC: 78 U/L (ref 45–117)
ALT SERPL-CCNC: 44 U/L (ref 12–78)
ANION GAP SERPL CALC-SCNC: 2 MMOL/L (ref 5–15)
AST SERPL-CCNC: 23 U/L (ref 15–37)
BILIRUB SERPL-MCNC: 0.4 MG/DL (ref 0.2–1)
BUN SERPL-MCNC: 17 MG/DL (ref 6–20)
BUN/CREAT SERPL: 27 (ref 12–20)
CALCIUM SERPL-MCNC: 9.2 MG/DL (ref 8.5–10.1)
CHLORIDE SERPL-SCNC: 102 MMOL/L (ref 97–108)
CHOLEST SERPL-MCNC: 167 MG/DL
CO2 SERPL-SCNC: 34 MMOL/L (ref 21–32)
CREAT SERPL-MCNC: 0.62 MG/DL (ref 0.55–1.02)
ERYTHROCYTE [DISTWIDTH] IN BLOOD BY AUTOMATED COUNT: 13.9 % (ref 11.5–14.5)
EST. AVERAGE GLUCOSE BLD GHB EST-MCNC: 120 MG/DL
GLOBULIN SER CALC-MCNC: 3.4 G/DL (ref 2–4)
GLUCOSE SERPL-MCNC: 100 MG/DL (ref 65–100)
HBA1C MFR BLD: 5.8 % (ref 4–5.6)
HCT VFR BLD AUTO: 42.1 % (ref 35–47)
HDLC SERPL-MCNC: 61 MG/DL
HDLC SERPL: 2.7 {RATIO} (ref 0–5)
HGB BLD-MCNC: 13.2 G/DL (ref 11.5–16)
LDLC SERPL CALC-MCNC: 78.4 MG/DL (ref 0–100)
MCH RBC QN AUTO: 29.5 PG (ref 26–34)
MCHC RBC AUTO-ENTMCNC: 31.4 G/DL (ref 30–36.5)
MCV RBC AUTO: 94 FL (ref 80–99)
NRBC # BLD: 0 K/UL (ref 0–0.01)
NRBC BLD-RTO: 0 PER 100 WBC
PLATELET # BLD AUTO: 273 K/UL (ref 150–400)
PMV BLD AUTO: 10.4 FL (ref 8.9–12.9)
POTASSIUM SERPL-SCNC: 4 MMOL/L (ref 3.5–5.1)
PROT SERPL-MCNC: 7 G/DL (ref 6.4–8.2)
RBC # BLD AUTO: 4.48 M/UL (ref 3.8–5.2)
SODIUM SERPL-SCNC: 138 MMOL/L (ref 136–145)
TRIGL SERPL-MCNC: 138 MG/DL (ref ?–150)
TSH SERPL DL<=0.05 MIU/L-ACNC: 1.96 UIU/ML (ref 0.36–3.74)
VLDLC SERPL CALC-MCNC: 27.6 MG/DL
WBC # BLD AUTO: 6.1 K/UL (ref 3.6–11)

## 2022-02-07 PROCEDURE — 99213 OFFICE O/P EST LOW 20 MIN: CPT | Performed by: FAMILY MEDICINE

## 2022-02-07 RX ORDER — CHOLECALCIFEROL TAB 125 MCG (5000 UNIT) 125 MCG
5000 TAB ORAL DAILY
COMMUNITY

## 2022-02-07 RX ORDER — ATENOLOL AND CHLORTHALIDONE TABLET 50; 25 MG/1; MG/1
0.5 TABLET ORAL DAILY
Qty: 90 TABLET | Refills: 1 | Status: SHIPPED | OUTPATIENT
Start: 2022-02-07 | End: 2022-03-26 | Stop reason: SDUPTHER

## 2022-02-07 RX ORDER — SIMVASTATIN 40 MG/1
40 TABLET, FILM COATED ORAL
Qty: 90 TABLET | Refills: 1 | Status: SHIPPED | OUTPATIENT
Start: 2022-02-07 | End: 2022-08-03 | Stop reason: SDUPTHER

## 2022-02-07 NOTE — PROGRESS NOTES
Chief Complaint   Patient presents with    Follow-up        1. \"Have you been to the ER, urgent care clinic since your last visit? Hospitalized since your last visit? \" Yes Reason for visit: Cough    2. \"Have you seen or consulted any other health care providers outside of the [address] [place] [address] since your last visit? \" No     3. For patients aged 39-70: Has the patient had a colonoscopy? Yes - no Care Gap present     If the patient is female:    4. For patients aged 41-77: Has the patient had a mammogram within the past 2 years? Yes - no Care Gap present    5. For patients aged 21-30: Has the patient had a pap smear?  NA - based on age    1 most recent PHQ Screens 2/7/2022   Little interest or pleasure in doing things Not at all   Feeling down, depressed, irritable, or hopeless Not at all   Total Score PHQ 2 0

## 2022-02-07 NOTE — PROGRESS NOTES
Elbert Caban  59 y.o. female  1960  [address] [facility]  [id number]     [address] [facility]       Encounter Date: 2/7/2022           Established Patient Visit Note: Analilia Hurtado MD    Reason for Appointment:  Chief Complaint   Patient presents with    Follow-up         History of Present Illness:  History provided by patient    Elbert Caban is a 59 y.o. female who presents to clinic today for:       · Blurry Vision, Ocular Migraine: patient reports that she saw ophthalmology who diagnosed ocular migraine and performed eye exam with no concerning findings. She reports that she has not any further symptoms recently. · Allergies, Chronic Cough: she has apt with allergy next week for Alvesco; She is not taking any allergy medicine at this time. She reports that when she stops the Alvesco, the cough gets worse. She denies any acid reflux symptoms. · HTN: not checking BP at home  · IGT: working on deit and exercse; last A1c was 5.6 on 7/6/21  · Vitamin D Deficiency: taking Vit D 5000 daily  · Thumb Pain: she reports that it is about the same. She has not followed up with the orthopedist recently  · HLD: denies any muscle aches with Zocor    HM  She has had J&J COVID vaccine with moderna booster  She reports that she has flu vaccine last Oct  She reports having mammogram in July at [facility] with GYN (Dr. Naomi Rosenbaum)      Review of Systems  ROS       Allergies: Pcn [penicillins]    Medications:     Current Outpatient Medications:     cholecalciferol (VITAMIN D3) (5000 Units/125 mcg) tab tablet, Take 5,000 Units by mouth daily. , Disp: , Rfl:     atenoloL-chlorthalidone (TENORETIC) 50-25 mg per tablet, Take 0.5 Tablets by mouth daily. , Disp: 90 Tablet, Rfl: 1    simvastatin (ZOCOR) 40 mg tablet, Take 1 Tablet by mouth nightly., Disp: 90 Tablet, Rfl: 1    potassium chloride (K-DUR, KLOR-CON M20) 20 mEq tablet, Take 2 Tablets by mouth daily. , Disp: 60 Tablet, Rfl: 5    Alvesco 160 mcg/actuation HFAA, INHALE TWO PUFFS BY MOUTH TWICE DAILY, Disp: , Rfl:     MELOXICAM PO, Take  by mouth., Disp: , Rfl:     tretinoin (RETIN-A) 0.025 % topical cream, APPLY CREAM TOPICALLY TO AFFECTED AREA ONCE DAILY AT BEDTIME TO FACE, Disp: , Rfl:     Flaxseed Oil oil, by Does Not Apply route., Disp: , Rfl:     cranberry extract (CRAN-MAX) 500 mg cap capsule, Take 500 mg by mouth., Disp: , Rfl:     B.infantis-B.ani-B.long-B.bifi (PROBIOTIC 4X) 10-15 mg TbEC, Take  by mouth., Disp: , Rfl:     History  Patient Care Team:  Arnie Dakin, MD as PCP - General (Family Medicine)  Arnie Dakin, MD as PCP - [facility]  Viktor Mart DO as Physician (Obstetrics & Gynecology)  Gilmer Castro MD as Physician (Dermatology)    Past Medical History: she has a past medical history of BMI 39.0-39.9,adult (1/20/2020), Essential hypertension (1/20/2020), Hypercholesteremia (1/20/2020), Severe obesity ([address] (1/20/2020), and Vitamin D deficiency (1/20/2020). Past Surgical History: she has a past surgical history that includes hx hysterectomy; hx shoulder arthroscopy (Right); and hx knee arthroscopy (Left). Family Medical History: family history includes Cancer in her father and mother. Social History: she reports that she has never smoked. She has never used smokeless tobacco. She reports previous alcohol use. She reports that she does not use drugs. Objective:   Visit Vitals  /80   Pulse 67   Temp 97.9 °F (36.6 °C)   Resp 16   Ht 5' 1\" (1.549 m)   Wt 195 lb (88.5 kg)   SpO2 97%   BMI 36.84 kg/m²     Wt Readings from Last 3 Encounters:   02/07/22 195 lb (88.5 kg)   08/04/21 193 lb (87.5 kg)   07/06/21 193 lb 12.8 oz (87.9 kg)       Physical Exam  Vitals and nursing note reviewed. Constitutional:       General: She is not in acute distress. Appearance: Normal appearance. HENT:      Head: Normocephalic and atraumatic.       Nose: Nose normal. Mouth/Throat:      Mouth: Mucous membranes are moist.   Eyes:      Extraocular Movements: Extraocular movements intact. Conjunctiva/sclera: Conjunctivae normal.      Pupils: Pupils are equal, round, and reactive to light. Cardiovascular:      Rate and Rhythm: Normal rate and regular rhythm. Pulses: Normal pulses. Heart sounds: Normal heart sounds. No murmur heard. No friction rub. No gallop. Pulmonary:      Effort: Pulmonary effort is normal. No respiratory distress. Breath sounds: Normal breath sounds. No wheezing, rhonchi or rales. Musculoskeletal:         General: Normal range of motion. Cervical back: Normal range of motion and neck supple. No rigidity. No muscular tenderness. Lymphadenopathy:      Cervical: No cervical adenopathy. Skin:     General: Skin is warm. Coloration: Skin is not jaundiced. Neurological:      General: No focal deficit present. Mental Status: She is alert. Mental status is at baseline. Cranial Nerves: Cranial nerves are intact. Motor: Motor function is intact. Coordination: Coordination is intact. Psychiatric:         Mood and Affect: Mood normal.         Behavior: Behavior normal.         Thought Content: Thought content normal.         Judgment: Judgment normal.         Assessment & Plan:      ICD-10-CM ICD-9-CM    1. Chronic cough  R05.3 786.2 XR CHEST PA LAT   2. Hypercholesteremia  E78.00 272.0 LIPID PANEL      simvastatin (ZOCOR) 40 mg tablet      LIPID PANEL   3. Essential hypertension  I10 401.9 CBC W/O DIFF      METABOLIC PANEL, COMPREHENSIVE      TSH 3RD GENERATION      atenoloL-chlorthalidone (TENORETIC) 50-25 mg per tablet      TSH 3RD GENERATION      METABOLIC PANEL, COMPREHENSIVE      CBC W/O DIFF   4. IGT (impaired glucose tolerance)  R73.02 790.22 HEMOGLOBIN A1C WITH EAG      HEMOGLOBIN A1C WITH EAG   5.  Vitamin D deficiency  E55.9 268.9 VITAMIN D, 25 HYDROXY      VITAMIN D, 25 HYDROXY   6. Blurry vision H53.8 368.8    7. Ocular migraine  G43.109 346.80    8. Seasonal allergic rhinitis due to pollen  J30.1 477.0    9. Environmental and seasonal allergies  J30.89 477.8    10. Bilateral thumb pain  M79.644 729.5     M79.645       · Blurry Vision, Ocular Migraine: Chronic, stable. Follow up with specialist as scheduled. · Allergies, Chronic Cough: Chronic, uncontrolled. Obtain xray and follow up with allergy as scheduled. · HTN:Chronic, stable. Continue current therapy. · IGT: Chronic, unclear control. Check labs  · Vitamin D Deficiency: Chronic, unclear control. Check labs  · Thumb Pain: Chronic, stable. Continue to monitor. · HLD: Chronic, stable. Check labs and continue current therapy. I have discussed the diagnosis with the patient and the intended plan as seen in the above orders. The patient has received an after-visit summary along with patient information handout. I have discussed medication side effects and warnings with the patient as well.     Disposition  Follow-up and Dispositions    · Return in about 6 months (around 8/7/2022) for annual physical.           Joon Barclay MD

## 2022-02-08 NOTE — PROGRESS NOTES
Dear Adriana Baeza,    Your labs show that your A1c (average blood sugar) is a little elevated. I would like for you to work on diet and exercise and we will continue to monitor this. The remainder of your labs are stable.      Kyleigh Hampton MD

## 2022-03-18 PROBLEM — R05.3 CHRONIC COUGH: Status: ACTIVE | Noted: 2020-07-01

## 2022-03-18 PROBLEM — R73.03 PREDIABETES: Status: ACTIVE | Noted: 2020-01-30

## 2022-03-19 PROBLEM — R79.89 ELEVATED LFTS: Status: ACTIVE | Noted: 2020-01-30

## 2022-03-19 PROBLEM — M79.644 BILATERAL THUMB PAIN: Status: ACTIVE | Noted: 2020-07-06

## 2022-03-19 PROBLEM — I10 ESSENTIAL HYPERTENSION: Status: ACTIVE | Noted: 2020-01-20

## 2022-03-19 PROBLEM — E55.9 VITAMIN D DEFICIENCY: Status: ACTIVE | Noted: 2020-01-20

## 2022-03-19 PROBLEM — M79.645 BILATERAL THUMB PAIN: Status: ACTIVE | Noted: 2020-07-06

## 2022-03-19 PROBLEM — M19.049 HAND ARTHRITIS: Status: ACTIVE | Noted: 2020-01-20

## 2022-03-20 PROBLEM — E66.01 SEVERE OBESITY (HCC): Status: ACTIVE | Noted: 2020-01-20

## 2022-03-20 PROBLEM — E78.00 HYPERCHOLESTEREMIA: Status: ACTIVE | Noted: 2020-01-20

## 2022-03-26 DIAGNOSIS — I10 ESSENTIAL HYPERTENSION: ICD-10-CM

## 2022-03-26 NOTE — TELEPHONE ENCOUNTER
PCP: Jaqueline Liao MD    Last appt: 2/7/2022  Future Appointments   Date Time Provider Leticia Leyva   7/15/2022  8:30 AM Jaqueline Liao MD [facility]       Requested Prescriptions     Pending Prescriptions Disp Refills    atenoloL-chlorthalidone (TENORETIC) 50-25 mg per tablet 90 Tablet 1     Sig: Take 0.5 Tablets by mouth daily.        Prior labs and Blood pressures:  BP Readings from Last 3 Encounters:   02/07/22 117/80   08/04/21 117/78   07/06/21 110/82     Lab Results   Component Value Date/Time    Sodium 138 02/07/2022 09:30 AM    Potassium 4.0 02/07/2022 09:30 AM    Chloride 102 02/07/2022 09:30 AM    CO2 34 (H) 02/07/2022 09:30 AM    Anion gap 2 (L) 02/07/2022 09:30 AM    Glucose 100 02/07/2022 09:30 AM    BUN 17 02/07/2022 09:30 AM    Creatinine 0.62 02/07/2022 09:30 AM    BUN/Creatinine ratio 27 (H) 02/07/2022 09:30 AM    GFR est AA >60 02/07/2022 09:30 AM    GFR est non-AA >60 02/07/2022 09:30 AM    Calcium 9.2 02/07/2022 09:30 AM     Lab Results   Component Value Date/Time    Hemoglobin A1c 5.8 (H) 02/07/2022 09:30 AM     Lab Results   Component Value Date/Time    Cholesterol, total 167 02/07/2022 09:30 AM    HDL Cholesterol 61 02/07/2022 09:30 AM    LDL, calculated 78.4 02/07/2022 09:30 AM    VLDL, calculated 27.6 02/07/2022 09:30 AM    Triglyceride 138 02/07/2022 09:30 AM    CHOL/HDL Ratio 2.7 02/07/2022 09:30 AM     Lab Results   Component Value Date/Time    Vitamin D 25-Hydroxy 54.6 02/07/2022 09:30 AM       Lab Results   Component Value Date/Time    TSH 1.96 02/07/2022 09:30 AM

## 2022-03-27 RX ORDER — ATENOLOL AND CHLORTHALIDONE TABLET 50; 25 MG/1; MG/1
0.5 TABLET ORAL DAILY
Qty: 90 TABLET | Refills: 1 | Status: SHIPPED | OUTPATIENT
Start: 2022-03-27 | End: 2022-08-03 | Stop reason: SDUPTHER

## 2022-08-03 ENCOUNTER — OFFICE VISIT (OUTPATIENT)
Dept: FAMILY MEDICINE CLINIC | Age: 62
End: 2022-08-03
Payer: COMMERCIAL

## 2022-08-03 VITALS
BODY MASS INDEX: 36.82 KG/M2 | HEART RATE: 64 BPM | HEIGHT: 61 IN | TEMPERATURE: 98 F | RESPIRATION RATE: 14 BRPM | OXYGEN SATURATION: 98 % | SYSTOLIC BLOOD PRESSURE: 100 MMHG | WEIGHT: 195 LBS | DIASTOLIC BLOOD PRESSURE: 73 MMHG

## 2022-08-03 DIAGNOSIS — I10 ESSENTIAL HYPERTENSION: ICD-10-CM

## 2022-08-03 DIAGNOSIS — E78.00 HYPERCHOLESTEREMIA: ICD-10-CM

## 2022-08-03 DIAGNOSIS — Z00.00 ANNUAL PHYSICAL EXAM: Primary | ICD-10-CM

## 2022-08-03 DIAGNOSIS — R05.3 CHRONIC COUGH: ICD-10-CM

## 2022-08-03 DIAGNOSIS — R73.03 PREDIABETES: ICD-10-CM

## 2022-08-03 DIAGNOSIS — E55.9 VITAMIN D DEFICIENCY: ICD-10-CM

## 2022-08-03 LAB
25(OH)D3 SERPL-MCNC: 71 NG/ML (ref 30–100)
ALBUMIN SERPL-MCNC: 3.7 G/DL (ref 3.5–5)
ALBUMIN/GLOB SERPL: 1 {RATIO} (ref 1.1–2.2)
ALP SERPL-CCNC: 93 U/L (ref 45–117)
ALT SERPL-CCNC: 63 U/L (ref 12–78)
ANION GAP SERPL CALC-SCNC: 6 MMOL/L (ref 5–15)
APPEARANCE UR: CLEAR
AST SERPL-CCNC: 37 U/L (ref 15–37)
BACTERIA URNS QL MICRO: NEGATIVE /HPF
BILIRUB SERPL-MCNC: 0.4 MG/DL (ref 0.2–1)
BILIRUB UR QL: NEGATIVE
BUN SERPL-MCNC: 15 MG/DL (ref 6–20)
BUN/CREAT SERPL: 25 (ref 12–20)
CALCIUM SERPL-MCNC: 9.1 MG/DL (ref 8.5–10.1)
CHLORIDE SERPL-SCNC: 100 MMOL/L (ref 97–108)
CHOLEST SERPL-MCNC: 165 MG/DL
CO2 SERPL-SCNC: 33 MMOL/L (ref 21–32)
COLOR UR: ABNORMAL
CREAT SERPL-MCNC: 0.6 MG/DL (ref 0.55–1.02)
EPITH CASTS URNS QL MICRO: ABNORMAL /LPF
ERYTHROCYTE [DISTWIDTH] IN BLOOD BY AUTOMATED COUNT: 13 % (ref 11.5–14.5)
EST. AVERAGE GLUCOSE BLD GHB EST-MCNC: 117 MG/DL
GLOBULIN SER CALC-MCNC: 3.7 G/DL (ref 2–4)
GLUCOSE SERPL-MCNC: 93 MG/DL (ref 65–100)
GLUCOSE UR STRIP.AUTO-MCNC: NEGATIVE MG/DL
HBA1C MFR BLD: 5.7 % (ref 4–5.6)
HCT VFR BLD AUTO: 44.4 % (ref 35–47)
HDLC SERPL-MCNC: 72 MG/DL
HDLC SERPL: 2.3 {RATIO} (ref 0–5)
HGB BLD-MCNC: 14.4 G/DL (ref 11.5–16)
HGB UR QL STRIP: NEGATIVE
HYALINE CASTS URNS QL MICRO: ABNORMAL /LPF (ref 0–5)
KETONES UR QL STRIP.AUTO: NEGATIVE MG/DL
LDLC SERPL CALC-MCNC: 63.8 MG/DL (ref 0–100)
LEUKOCYTE ESTERASE UR QL STRIP.AUTO: ABNORMAL
MCH RBC QN AUTO: 30 PG (ref 26–34)
MCHC RBC AUTO-ENTMCNC: 32.4 G/DL (ref 30–36.5)
MCV RBC AUTO: 92.5 FL (ref 80–99)
NITRITE UR QL STRIP.AUTO: NEGATIVE
NRBC # BLD: 0 K/UL (ref 0–0.01)
NRBC BLD-RTO: 0 PER 100 WBC
PH UR STRIP: 7 [PH] (ref 5–8)
PLATELET # BLD AUTO: 255 K/UL (ref 150–400)
PMV BLD AUTO: 10 FL (ref 8.9–12.9)
POTASSIUM SERPL-SCNC: 3.9 MMOL/L (ref 3.5–5.1)
PROT SERPL-MCNC: 7.4 G/DL (ref 6.4–8.2)
PROT UR STRIP-MCNC: NEGATIVE MG/DL
RBC # BLD AUTO: 4.8 M/UL (ref 3.8–5.2)
RBC #/AREA URNS HPF: ABNORMAL /HPF (ref 0–5)
SODIUM SERPL-SCNC: 139 MMOL/L (ref 136–145)
SP GR UR REFRACTOMETRY: 1.02 (ref 1–1.03)
TRIGL SERPL-MCNC: 146 MG/DL (ref ?–150)
TSH SERPL DL<=0.05 MIU/L-ACNC: 1.36 UIU/ML (ref 0.36–3.74)
UROBILINOGEN UR QL STRIP.AUTO: 0.2 EU/DL (ref 0.2–1)
VLDLC SERPL CALC-MCNC: 29.2 MG/DL
WBC # BLD AUTO: 6.3 K/UL (ref 3.6–11)
WBC URNS QL MICRO: ABNORMAL /HPF (ref 0–4)

## 2022-08-03 PROCEDURE — 99396 PREV VISIT EST AGE 40-64: CPT | Performed by: FAMILY MEDICINE

## 2022-08-03 RX ORDER — SIMVASTATIN 40 MG/1
40 TABLET, FILM COATED ORAL
Qty: 90 TABLET | Refills: 1 | Status: SHIPPED | OUTPATIENT
Start: 2022-08-03

## 2022-08-03 RX ORDER — POTASSIUM CHLORIDE 20 MEQ/1
40 TABLET, EXTENDED RELEASE ORAL DAILY
Qty: 180 TABLET | Refills: 1 | Status: SHIPPED | OUTPATIENT
Start: 2022-08-03

## 2022-08-03 RX ORDER — ATENOLOL AND CHLORTHALIDONE TABLET 50; 25 MG/1; MG/1
0.5 TABLET ORAL DAILY
Qty: 45 TABLET | Refills: 1 | Status: SHIPPED | OUTPATIENT
Start: 2022-08-03

## 2022-08-03 RX ORDER — SIMVASTATIN 40 MG/1
40 TABLET, FILM COATED ORAL
Qty: 90 TABLET | Refills: 1 | Status: CANCELLED | OUTPATIENT
Start: 2022-08-03

## 2022-08-03 NOTE — PROGRESS NOTES
Chief Complaint   Patient presents with    Complete Physical       1. \"Have you been to the ER, urgent care clinic since your last visit? Hospitalized since your last visit? \" No    2. \"Have you seen or consulted any other health care providers outside of the [address] [place] [address] since your last visit? \" No     3. For patients aged 39-70: Has the patient had a colonoscopy / FIT/ Cologuard? Yes - no Care Gap present      If the patient is female:    4. For patients aged 41-77: Has the patient had a mammogram within the past 2 years? Yes - no Care Gap present      5. For patients aged 21-65: Has the patient had a pap smear? Yes - no Care Gap present         3 most recent PHQ Screens 8/3/2022   Little interest or pleasure in doing things Not at all   Feeling down, depressed, irritable, or hopeless Not at all   Total Score PHQ 2 0         Abuse Screening Questionnaire 8/3/2022   Do you ever feel afraid of your partner? N   Are you in a relationship with someone who physically or mentally threatens you? N   Is it safe for you to go home?  Y          Health Maintenance Due   Topic Date Due    COVID-19 Vaccine (2 - Booster for Temitope series) 05/08/2021    DTaP/Tdap/Td series (2 - Td or Tdap) 06/15/2022

## 2022-08-03 NOTE — TELEPHONE ENCOUNTER
Last Visit: 2/7/22 MD Alexis Mackey, lipid 2/2022  Next Appointment: Today- MD Alexis Mackey (8/3/22)  Previous Refill Encounter(s): 2/7/22 90 + 1    Requested Prescriptions     Pending Prescriptions Disp Refills    simvastatin (ZOCOR) 40 mg tablet 90 Tablet 1     Sig: Take 1 Tablet by mouth nightly. For [facility] in place:   Recommendation Provided To:    Intervention Detail: New Rx: 1, reason: Patient Preference  Gap Closed?:   Intervention Accepted By:   Time Spent (min): 5

## 2022-08-11 ENCOUNTER — HOSPITAL ENCOUNTER (OUTPATIENT)
Dept: CT IMAGING | Age: 62
Discharge: HOME | End: 2022-08-11
Attending: FAMILY MEDICINE
Payer: COMMERCIAL

## 2022-08-11 DIAGNOSIS — R05.3 CHRONIC COUGH: ICD-10-CM

## 2022-08-11 PROCEDURE — 71260 CT THORAX DX C+: CPT

## 2022-08-11 PROCEDURE — 74011000636 HC RX REV CODE- 636: Performed by: FAMILY MEDICINE

## 2022-08-11 RX ADMIN — IOPAMIDOL 100 ML: 755 INJECTION, SOLUTION INTRAVENOUS at 17:50

## 2022-08-12 DIAGNOSIS — E04.1 THYROID NODULE: Primary | ICD-10-CM

## 2022-08-12 NOTE — PROGRESS NOTES
Called patient and discussed imaging results. Will obtain US and provide referral to ENT.      Dianna Miller MD

## 2022-08-30 ENCOUNTER — HOSPITAL ENCOUNTER (OUTPATIENT)
Dept: ULTRASOUND IMAGING | Age: 62
Discharge: HOME | End: 2022-08-30
Attending: FAMILY MEDICINE
Payer: COMMERCIAL

## 2022-08-30 DIAGNOSIS — E04.1 THYROID NODULE: ICD-10-CM

## 2022-08-30 PROCEDURE — 76536 US EXAM OF HEAD AND NECK: CPT

## 2022-09-21 ENCOUNTER — TRANSCRIBE ORDER (OUTPATIENT)
Dept: SCHEDULING | Age: 62
End: 2022-09-21

## 2022-09-21 DIAGNOSIS — D34 THYROID ADENOMA: Primary | ICD-10-CM

## 2022-09-22 ENCOUNTER — TRANSCRIBE ORDER (OUTPATIENT)
Dept: SCHEDULING | Age: 62
End: 2022-09-22

## 2022-09-22 DIAGNOSIS — D34 THYROID ADENOMA: Primary | ICD-10-CM

## 2022-10-17 ENCOUNTER — HOSPITAL ENCOUNTER (OUTPATIENT)
Dept: ULTRASOUND IMAGING | Age: 62
Discharge: HOME | End: 2022-10-17
Attending: OTOLARYNGOLOGY
Payer: COMMERCIAL

## 2022-10-17 DIAGNOSIS — D34 THYROID ADENOMA: ICD-10-CM

## 2022-10-17 PROCEDURE — 77030014115

## 2022-10-17 PROCEDURE — 74011000250 HC RX REV CODE- 250: Performed by: RADIOLOGY

## 2022-10-17 PROCEDURE — 88172 CYTP DX EVAL FNA 1ST EA SITE: CPT

## 2022-10-17 PROCEDURE — 88173 CYTOPATH EVAL FNA REPORT: CPT

## 2022-10-17 PROCEDURE — 10005 FNA BX W/US GDN 1ST LES: CPT

## 2022-10-17 RX ORDER — SODIUM BICARBONATE 42 MG/ML
2 INJECTION, SOLUTION INTRAVENOUS
Status: COMPLETED | OUTPATIENT
Start: 2022-10-17 | End: 2022-10-17

## 2022-10-17 RX ORDER — LIDOCAINE HYDROCHLORIDE 10 MG/ML
8 INJECTION INFILTRATION; PERINEURAL
Status: COMPLETED | OUTPATIENT
Start: 2022-10-17 | End: 2022-10-17

## 2022-10-17 RX ADMIN — SODIUM BICARBONATE 84 MG: 42 INJECTION, SOLUTION INTRAVENOUS at 09:20

## 2022-10-17 RX ADMIN — LIDOCAINE HYDROCHLORIDE 8 ML: 10 INJECTION, SOLUTION INFILTRATION; PERINEURAL at 09:20

## 2022-11-17 ENCOUNTER — TRANSCRIBE ORDER (OUTPATIENT)
Dept: SCHEDULING | Age: 62
End: 2022-11-17

## 2022-11-17 DIAGNOSIS — D34 BENIGN NEOPLASM OF THYROID GLAND: Primary | ICD-10-CM

## 2022-12-30 ENCOUNTER — HOSPITAL ENCOUNTER (OUTPATIENT)
Dept: ULTRASOUND IMAGING | Age: 62
Discharge: HOME | End: 2022-12-30
Attending: OTOLARYNGOLOGY
Payer: COMMERCIAL

## 2022-12-30 DIAGNOSIS — D34 BENIGN NEOPLASM OF THYROID GLAND: ICD-10-CM

## 2022-12-30 PROCEDURE — 88173 CYTOPATH EVAL FNA REPORT: CPT

## 2022-12-30 PROCEDURE — 74011000250 HC RX REV CODE- 250: Performed by: RADIOLOGY

## 2022-12-30 PROCEDURE — 77030014115

## 2022-12-30 PROCEDURE — 10005 FNA BX W/US GDN 1ST LES: CPT

## 2022-12-30 PROCEDURE — 88172 CYTP DX EVAL FNA 1ST EA SITE: CPT

## 2022-12-30 RX ORDER — LIDOCAINE HYDROCHLORIDE 10 MG/ML
5 INJECTION, SOLUTION EPIDURAL; INFILTRATION; INTRACAUDAL; PERINEURAL
Status: COMPLETED | OUTPATIENT
Start: 2022-12-30 | End: 2022-12-30

## 2022-12-30 RX ORDER — SODIUM BICARBONATE 42 MG/ML
2 INJECTION, SOLUTION INTRAVENOUS
Status: COMPLETED | OUTPATIENT
Start: 2022-12-30 | End: 2022-12-30

## 2022-12-30 RX ADMIN — LIDOCAINE HYDROCHLORIDE 5 ML: 10 INJECTION, SOLUTION EPIDURAL; INFILTRATION; INTRACAUDAL; PERINEURAL at 14:42

## 2022-12-30 RX ADMIN — SODIUM BICARBONATE 2 MG: 42 INJECTION, SOLUTION INTRAVENOUS at 14:45

## 2023-02-03 ENCOUNTER — OFFICE VISIT (OUTPATIENT)
Dept: FAMILY MEDICINE CLINIC | Age: 63
End: 2023-02-03

## 2023-02-03 VITALS
DIASTOLIC BLOOD PRESSURE: 72 MMHG | RESPIRATION RATE: 16 BRPM | TEMPERATURE: 98.1 F | SYSTOLIC BLOOD PRESSURE: 110 MMHG | HEART RATE: 63 BPM | HEIGHT: 61 IN | WEIGHT: 197 LBS | BODY MASS INDEX: 37.19 KG/M2 | OXYGEN SATURATION: 99 %

## 2023-02-03 DIAGNOSIS — E78.00 HYPERCHOLESTEREMIA: ICD-10-CM

## 2023-02-03 DIAGNOSIS — M79.644 BILATERAL THUMB PAIN: ICD-10-CM

## 2023-02-03 DIAGNOSIS — H53.8 BLURRY VISION: ICD-10-CM

## 2023-02-03 DIAGNOSIS — R05.3 CHRONIC COUGH: ICD-10-CM

## 2023-02-03 DIAGNOSIS — J30.89 ENVIRONMENTAL AND SEASONAL ALLERGIES: ICD-10-CM

## 2023-02-03 DIAGNOSIS — M79.645 BILATERAL THUMB PAIN: ICD-10-CM

## 2023-02-03 DIAGNOSIS — E55.9 VITAMIN D DEFICIENCY: ICD-10-CM

## 2023-02-03 DIAGNOSIS — R73.03 PREDIABETES: ICD-10-CM

## 2023-02-03 DIAGNOSIS — G89.29 CHRONIC RIGHT SHOULDER PAIN: Primary | ICD-10-CM

## 2023-02-03 DIAGNOSIS — I10 ESSENTIAL HYPERTENSION: ICD-10-CM

## 2023-02-03 DIAGNOSIS — G43.109 OCULAR MIGRAINE: ICD-10-CM

## 2023-02-03 DIAGNOSIS — Z98.890 HISTORY OF SHOULDER SURGERY: ICD-10-CM

## 2023-02-03 DIAGNOSIS — M25.511 CHRONIC RIGHT SHOULDER PAIN: Primary | ICD-10-CM

## 2023-02-03 PROCEDURE — 3074F SYST BP LT 130 MM HG: CPT | Performed by: FAMILY MEDICINE

## 2023-02-03 PROCEDURE — 99213 OFFICE O/P EST LOW 20 MIN: CPT | Performed by: FAMILY MEDICINE

## 2023-02-03 PROCEDURE — 3078F DIAST BP <80 MM HG: CPT | Performed by: FAMILY MEDICINE

## 2023-02-03 RX ORDER — OMEPRAZOLE 20 MG/1
20 CAPSULE, DELAYED RELEASE ORAL 2 TIMES DAILY
COMMUNITY

## 2023-02-03 NOTE — PROGRESS NOTES
Giancarlo Gaines  57 y.o. female  1960  [address]  [id number]     [address] [facility]       Encounter Date: 2/3/2023           Established Patient Visit Note: Demetrio Ramirez MD    Reason for Appointment:  Chief Complaint   Patient presents with    Hypertension         History of Present Illness:  History provided by {Relatives - adult:5061}    Giancarlo Gaines is a 57 y.o. female who presents to clinic today for:     Right Shoulder Pain: she reports that when she will try to lift something she will have pain in the right upper shoulder. She reports having hx of shoulder surgery in 1987. She reports that thishas been progressivly worse over the last six months  Thyroid Nodule: she is followed by ENT (Dr. Josette Beltrán) with last visit on 1/12/23. Chronic Cough: Followed by ENT (see above). Also followed by GI (Dr. Catalina Huffman) who started omeprazole, who performed barium swallow showing hiatal hernia. She also had evaluation with allergist that did not seem to help. She has also seen pulmonology. She had CT chest on 8/11/22 showing clear lungs and thyroid nodules (see above)). Blurry Vision, Ocular Migraine: She reports that she has not had any further symptoms  Allergies, Chronic Cough: followed by allergy. Continues Alvesco; She is not taking any allergy medicine at this time. She reports that when she stops the Alvesco, the cough gets worse. She denies any acid reflux symptoms. She had CXR on 2/7/22 that was normal.   HTN: continues atenolol-chlorthalidone 50-25mg. Not checking BP at home. She denies any headache, vision changes, or dizziness. Prediabets: last A1c was 5.7 on 8/3/22. Vitamin D Deficiency: taking Vit D 5000 every other day  Thumb Pain: she reports that it is about the same. She has not followed up with the orthopedist recently. She reports that she would like to see different hand doctor.    HLD: denies any muscle aches with Zocor     HM  She has had J&J COVID vaccine with 1 moderna booster. She declines further boosters. Mammogram: she reports [facility] Sharmin Gipson)  Skin Checks: followed by dermatology. She reports next visit in March, 2023  Flu Vaccine: she reports having this with her GYN        Review of Systems  All other ROS were reviewed and are negative except as discussed in HPI           Allergies: Pcn [penicillins]    Medications:     Current Outpatient Medications:     omeprazole (PRILOSEC) 20 mg capsule, Take 20 mg by mouth two (2) times a day., Disp: , Rfl:     atenoloL-chlorthalidone (TENORETIC) 50-25 mg per tablet, Take 0.5 Tablets by mouth in the morning., Disp: 45 Tablet, Rfl: 1    simvastatin (ZOCOR) 40 mg tablet, Take 1 Tablet by mouth nightly., Disp: 90 Tablet, Rfl: 1    potassium chloride (K-DUR, KLOR-CON M20) 20 mEq tablet, Take 2 Tablets by mouth in the morning., Disp: 180 Tablet, Rfl: 1    cholecalciferol (VITAMIN D3) (5000 Units/125 mcg) tab tablet, Take 5,000 Units by mouth daily. , Disp: , Rfl:     Alvesco 160 mcg/actuation HFAA, INHALE TWO PUFFS BY MOUTH TWICE DAILY, Disp: , Rfl:     Flaxseed Oil oil, by Does Not Apply route., Disp: , Rfl:     cranberry extract 500 mg cap capsule, Take 500 mg by mouth., Disp: , Rfl:     B.animalis,bifid,infantis,long 10-15 mg TbEC, Take  by mouth., Disp: , Rfl:     History  Patient Care Team:  Gershon Boxer, MD as PCP - General (Family Medicine)  Gershon Boxer, MD as PCP - [facility] Provider  Albert Guerrero DO as Physician (Obstetrics & Gynecology)  Keyla Duke MD as Physician (Dermatology Physician)    Past Medical History: she has a past medical history of BMI 39.0-39.9,adult (1/20/2020), Essential hypertension (1/20/2020), Hypercholesteremia (1/20/2020), Severe obesity ([address] (1/20/2020), and Vitamin D deficiency (1/20/2020).     Past Surgical History: she has a past surgical history that includes hx hysterectomy; hx shoulder arthroscopy (Right); and hx knee arthroscopy (Left). Family Medical History: family history includes Cancer in her father and mother. Social History: she reports that she has never smoked. She has never used smokeless tobacco. She reports that she does not currently use alcohol. She reports that she does not use drugs. Objective:   Visit Vitals  /72 (BP 1 Location: Left upper arm, BP Patient Position: Sitting, BP Cuff Size: Large adult)   Pulse 63   Temp 98.1 °F (36.7 °C) (Temporal)   Resp 16   Ht 5' 1\" (1.549 m)   Wt 197 lb (89.4 kg)   SpO2 99%   BMI 37.22 kg/m²     Wt Readings from Last 3 Encounters:   02/03/23 197 lb (89.4 kg)   08/03/22 195 lb (88.5 kg)   02/07/22 195 lb (88.5 kg)       Physical Exam    Assessment & Plan:    {No Diagnosis Found}        I have discussed the diagnosis with the patient and the intended plan as seen in the above orders. The patient has received an after-visit summary along with patient information handout. I have discussed medication side effects and warnings with the patient as well.     Disposition        Steve Rosenberg MD is intact. Coordination: Coordination is intact. Psychiatric:         Mood and Affect: Mood normal.         Behavior: Behavior normal.         Thought Content: Thought content normal.         Judgment: Judgment normal.       Assessment & Plan:      ICD-10-CM ICD-9-CM    1. Chronic right shoulder pain  M25.511 719.41 REFERRAL TO ORTHOPEDICS    G89.29 338.29       2. History of shoulder surgery  Z98.890 V45.89 REFERRAL TO ORTHOPEDICS      3. Bilateral thumb pain  M79.644 729.5 REFERRAL TO ORTHOPEDICS    M79.645        4. Hypercholesteremia  E78.00 272.0 LIPID PANEL      LIPID PANEL      5. Prediabetes  R73.03 790.29 HEMOGLOBIN A1C WITH EAG      HEMOGLOBIN A1C WITH EAG      6. Essential hypertension  I10 401.9 CBC W/O DIFF      METABOLIC PANEL, COMPREHENSIVE      URINALYSIS W/ RFLX MICROSCOPIC      TSH 3RD GENERATION      TSH 3RD GENERATION      URINALYSIS W/ RFLX MICROSCOPIC      METABOLIC PANEL, COMPREHENSIVE      CBC W/O DIFF      7. Vitamin D deficiency  E55.9 268.9 VITAMIN D, 25 HYDROXY      VITAMIN D, 25 HYDROXY      8. Chronic cough  R05.3 786.2       9. Blurry vision  H53.8 368.8       10. Ocular migraine  G43.109 346.80       11. Environmental and seasonal allergies  J30.89 477.8         Right Shoulder Pain in Setting of Hx of Shoulder Surgery: Chronic, uncontrolled. Referral to orthopedics for further evaluation. Bilateral thumb Pain: Chronic, uncontrolled. Referral to orthopedics for further evaluation. All other conditions listed above: Chronic, stable and/or managed by specialist. Will continue current treatment regimen. Will check labs as reflected above. Discussed following up with specialist as scheduled. Discussed recommendations for diet and exercise. I have discussed the diagnosis with the patient and the intended plan as seen in the above orders. The patient has received an after-visit summary along with patient information handout.   I have discussed medication side effects and warnings with the patient as well.     Disposition  Follow-up and Dispositions    Return in about 7 months (around 9/3/2023) for Welcome to Medicare Tamy Garcia MD

## 2023-02-03 NOTE — PROGRESS NOTES
Chief Complaint   Patient presents with    Hypertension     1. \"Have you been to the ER, urgent care clinic since your last visit? Hospitalized since your last visit? \" no  Testing per specialist   2. \"Have you seen or consulted any other health care providers outside of the [address] [place] [address] since your last visit? \"      Jennifer Romo

## 2023-02-04 LAB
25(OH)D3 SERPL-MCNC: 50 NG/ML (ref 30–100)
ALBUMIN SERPL-MCNC: 3.8 G/DL (ref 3.5–5)
ALBUMIN/GLOB SERPL: 1.1 (ref 1.1–2.2)
ALP SERPL-CCNC: 100 U/L (ref 45–117)
ALT SERPL-CCNC: 55 U/L (ref 12–78)
ANION GAP SERPL CALC-SCNC: 5 MMOL/L (ref 5–15)
APPEARANCE UR: CLEAR
AST SERPL-CCNC: 24 U/L (ref 15–37)
BILIRUB SERPL-MCNC: 0.5 MG/DL (ref 0.2–1)
BILIRUB UR QL: NEGATIVE
BUN SERPL-MCNC: 10 MG/DL (ref 6–20)
BUN/CREAT SERPL: 14 (ref 12–20)
CALCIUM SERPL-MCNC: 9.3 MG/DL (ref 8.5–10.1)
CHLORIDE SERPL-SCNC: 101 MMOL/L (ref 97–108)
CHOLEST SERPL-MCNC: 191 MG/DL
CO2 SERPL-SCNC: 34 MMOL/L (ref 21–32)
COLOR UR: NORMAL
CREAT SERPL-MCNC: 0.73 MG/DL (ref 0.55–1.02)
ERYTHROCYTE [DISTWIDTH] IN BLOOD BY AUTOMATED COUNT: 13.2 % (ref 11.5–14.5)
EST. AVERAGE GLUCOSE BLD GHB EST-MCNC: 111 MG/DL
GLOBULIN SER CALC-MCNC: 3.5 G/DL (ref 2–4)
GLUCOSE SERPL-MCNC: 102 MG/DL (ref 65–100)
GLUCOSE UR STRIP.AUTO-MCNC: NEGATIVE MG/DL
HBA1C MFR BLD: 5.5 % (ref 4–5.6)
HCT VFR BLD AUTO: 43.1 % (ref 35–47)
HDLC SERPL-MCNC: 59 MG/DL
HDLC SERPL: 3.2 (ref 0–5)
HGB BLD-MCNC: 13.9 G/DL (ref 11.5–16)
HGB UR QL STRIP: NEGATIVE
KETONES UR QL STRIP.AUTO: NEGATIVE MG/DL
LDLC SERPL CALC-MCNC: 92.2 MG/DL (ref 0–100)
LEUKOCYTE ESTERASE UR QL STRIP.AUTO: NEGATIVE
MCH RBC QN AUTO: 30.5 PG (ref 26–34)
MCHC RBC AUTO-ENTMCNC: 32.3 G/DL (ref 30–36.5)
MCV RBC AUTO: 94.5 FL (ref 80–99)
NITRITE UR QL STRIP.AUTO: NEGATIVE
NRBC # BLD: 0 K/UL (ref 0–0.01)
NRBC BLD-RTO: 0 PER 100 WBC
PH UR STRIP: 8 (ref 5–8)
PLATELET # BLD AUTO: 260 K/UL (ref 150–400)
PMV BLD AUTO: 10.8 FL (ref 8.9–12.9)
POTASSIUM SERPL-SCNC: 4.4 MMOL/L (ref 3.5–5.1)
PROT SERPL-MCNC: 7.3 G/DL (ref 6.4–8.2)
PROT UR STRIP-MCNC: NEGATIVE MG/DL
RBC # BLD AUTO: 4.56 M/UL (ref 3.8–5.2)
SODIUM SERPL-SCNC: 140 MMOL/L (ref 136–145)
SP GR UR REFRACTOMETRY: 1.01 (ref 1–1.03)
TRIGL SERPL-MCNC: 199 MG/DL (ref ?–150)
TSH SERPL DL<=0.05 MIU/L-ACNC: 1.29 UIU/ML (ref 0.36–3.74)
UROBILINOGEN UR QL STRIP.AUTO: 0.2 EU/DL (ref 0.2–1)
VLDLC SERPL CALC-MCNC: 39.8 MG/DL
WBC # BLD AUTO: 6.5 K/UL (ref 3.6–11)

## 2023-02-09 DIAGNOSIS — E78.00 HYPERCHOLESTEREMIA: ICD-10-CM

## 2023-02-10 RX ORDER — SIMVASTATIN 40 MG/1
40 TABLET, FILM COATED ORAL
Qty: 90 TABLET | Refills: 1 | Status: SHIPPED | OUTPATIENT
Start: 2023-02-10

## 2023-02-10 NOTE — TELEPHONE ENCOUNTER
Patient mychart request for refill simvastatin. Thanks, Markel Melo    Last Visit: 2/3/23 MD Germania Guthrie, labs completed  Next Appointment: 9/5/23 MD Niya Vizcarra  Previous Refill Encounter(s): 8/3/22 90 + 1    Requested Prescriptions     Pending Prescriptions Disp Refills    simvastatin (ZOCOR) 40 mg tablet 90 Tablet 1     Sig: Take 1 Tablet by mouth nightly. For Pharmacy Admin Tracking Only    Program: Medication Refill  CPA in place:   Recommendation Provided To:    Intervention Detail: New Rx: 1, reason: Patient Preference  Intervention Accepted By:   Lo Cain Closed?:   Time Spent (min): 5

## 2023-03-07 RX ORDER — POTASSIUM CHLORIDE 20 MEQ/1
40 TABLET, EXTENDED RELEASE ORAL DAILY
Qty: 180 TABLET | Refills: 1 | Status: SHIPPED | OUTPATIENT
Start: 2023-03-07

## 2023-03-07 NOTE — TELEPHONE ENCOUNTER
Chief Complaint   Patient presents with    Medication Refill     potassium chloride (K-DUR, KLOR-CON M20) 20 mEq tablet             PCP: Breana Larios MD    Last appt: 2/3/2023  Future Appointments   Date Time Provider Leticia Leyva   9/5/2023  8:20 AM Debbie Sadler MD [facility] BS AM[facility]       Requested Prescriptions     Pending Prescriptions Disp Refills    potassium chloride (K-DUR, KLOR-CON M20) 20 mEq tablet 180 Tablet 1     Sig: Take 2 Tablets by mouth daily.        Prior labs and Blood pressures:  BP Readings from Last 3 Encounters:   02/03/23 110/72   08/03/22 100/73   02/07/22 117/80     Lab Results   Component Value Date/Time    Sodium 140 02/03/2023 09:53 AM    Potassium 4.4 02/03/2023 09:53 AM    Chloride 101 02/03/2023 09:53 AM    CO2 34 (H) 02/03/2023 09:53 AM    Anion gap 5 02/03/2023 09:53 AM    Glucose 102 (H) 02/03/2023 09:53 AM    BUN 10 02/03/2023 09:53 AM    Creatinine 0.73 02/03/2023 09:53 AM    BUN/Creatinine ratio 14 02/03/2023 09:53 AM    GFR est AA >60 08/03/2022 10:47 AM    GFR est non-AA >60 08/03/2022 10:47 AM    Calcium 9.3 02/03/2023 09:53 AM     Lab Results   Component Value Date/Time    Hemoglobin A1c 5.5 02/03/2023 09:53 AM     Lab Results   Component Value Date/Time    Cholesterol, total 191 02/03/2023 09:53 AM    HDL Cholesterol 59 02/03/2023 09:53 AM    LDL, calculated 92.2 02/03/2023 09:53 AM    VLDL, calculated 39.8 02/03/2023 09:53 AM    Triglyceride 199 (H) 02/03/2023 09:53 AM    CHOL/HDL Ratio 3.2 02/03/2023 09:53 AM     Lab Results   Component Value Date/Time    Vitamin D 25-Hydroxy 50.0 02/03/2023 09:53 AM       Lab Results   Component Value Date/Time    TSH 1.29 02/03/2023 09:53 AM

## 2023-03-14 DIAGNOSIS — I10 ESSENTIAL HYPERTENSION: ICD-10-CM

## 2023-03-14 RX ORDER — ATENOLOL AND CHLORTHALIDONE TABLET 50; 25 MG/1; MG/1
0.5 TABLET ORAL DAILY
Qty: 45 TABLET | Refills: 1 | Status: SHIPPED | OUTPATIENT
Start: 2023-03-14

## 2023-03-14 NOTE — TELEPHONE ENCOUNTER
PCP: David Echeverria MD    Last appt: 2/3/2023  Future Appointments   Date Time Provider Leticia Leyva   9/5/2023  8:20 AM Peter Poole MD [facility] BS AMB       Requested Prescriptions     Pending Prescriptions Disp Refills    atenoloL-chlorthalidone (TENORETIC) 50-25 mg per tablet 45 Tablet 1     Sig: Take 0.5 Tablets by mouth daily.        Prior labs and Blood pressures:  BP Readings from Last 3 Encounters:   02/03/23 110/72   08/03/22 100/73   02/07/22 117/80     Lab Results   Component Value Date/Time    Sodium 140 02/03/2023 09:53 AM    Potassium 4.4 02/03/2023 09:53 AM    Chloride 101 02/03/2023 09:53 AM    CO2 34 (H) 02/03/2023 09:53 AM    Anion gap 5 02/03/2023 09:53 AM    Glucose 102 (H) 02/03/2023 09:53 AM    BUN 10 02/03/2023 09:53 AM    Creatinine 0.73 02/03/2023 09:53 AM    BUN/Creatinine ratio 14 02/03/2023 09:53 AM    GFR est AA >60 08/03/2022 10:47 AM    GFR est non-AA >60 08/03/2022 10:47 AM    Calcium 9.3 02/03/2023 09:53 AM     Lab Results   Component Value Date/Time    Hemoglobin A1c 5.5 02/03/2023 09:53 AM     Lab Results   Component Value Date/Time    Cholesterol, total 191 02/03/2023 09:53 AM    HDL Cholesterol 59 02/03/2023 09:53 AM    LDL, calculated 92.2 02/03/2023 09:53 AM    VLDL, calculated 39.8 02/03/2023 09:53 AM    Triglyceride 199 (H) 02/03/2023 09:53 AM    CHOL/HDL Ratio 3.2 02/03/2023 09:53 AM     Lab Results   Component Value Date/Time    Vitamin D 25-Hydroxy 50.0 02/03/2023 09:53 AM       Lab Results   Component Value Date/Time    TSH 1.29 02/03/2023 09:53 AM

## 2023-04-21 DIAGNOSIS — D34 THYROID ADENOMA: Primary | ICD-10-CM

## 2023-05-15 SDOH — ECONOMIC STABILITY: FOOD INSECURITY: WITHIN THE PAST 12 MONTHS, THE FOOD YOU BOUGHT JUST DIDN'T LAST AND YOU DIDN'T HAVE MONEY TO GET MORE.: NEVER TRUE

## 2023-05-15 SDOH — ECONOMIC STABILITY: TRANSPORTATION INSECURITY
IN THE PAST 12 MONTHS, HAS LACK OF TRANSPORTATION KEPT YOU FROM MEETINGS, WORK, OR FROM GETTING THINGS NEEDED FOR DAILY LIVING?: NO

## 2023-05-15 SDOH — ECONOMIC STABILITY: FOOD INSECURITY: WITHIN THE PAST 12 MONTHS, YOU WORRIED THAT YOUR FOOD WOULD RUN OUT BEFORE YOU GOT MONEY TO BUY MORE.: NEVER TRUE

## 2023-05-15 SDOH — ECONOMIC STABILITY: INCOME INSECURITY: HOW HARD IS IT FOR YOU TO PAY FOR THE VERY BASICS LIKE FOOD, HOUSING, MEDICAL CARE, AND HEATING?: NOT HARD AT ALL

## 2023-05-15 SDOH — ECONOMIC STABILITY: HOUSING INSECURITY
IN THE LAST 12 MONTHS, WAS THERE A TIME WHEN YOU DID NOT HAVE A STEADY PLACE TO SLEEP OR SLEPT IN A SHELTER (INCLUDING NOW)?: NO

## 2023-05-18 ENCOUNTER — OFFICE VISIT (OUTPATIENT)
Age: 63
End: 2023-05-18
Payer: COMMERCIAL

## 2023-05-18 VITALS
SYSTOLIC BLOOD PRESSURE: 102 MMHG | HEART RATE: 88 BPM | HEIGHT: 61 IN | BODY MASS INDEX: 36.55 KG/M2 | RESPIRATION RATE: 18 BRPM | TEMPERATURE: 97.3 F | OXYGEN SATURATION: 100 % | WEIGHT: 193.6 LBS | DIASTOLIC BLOOD PRESSURE: 70 MMHG

## 2023-05-18 DIAGNOSIS — E78.00 HYPERCHOLESTEREMIA: ICD-10-CM

## 2023-05-18 DIAGNOSIS — I10 ESSENTIAL HYPERTENSION: ICD-10-CM

## 2023-05-18 DIAGNOSIS — E04.1 THYROID NODULE: ICD-10-CM

## 2023-05-18 DIAGNOSIS — R05.3 CHRONIC COUGH: Primary | ICD-10-CM

## 2023-05-18 PROCEDURE — 99214 OFFICE O/P EST MOD 30 MIN: CPT | Performed by: STUDENT IN AN ORGANIZED HEALTH CARE EDUCATION/TRAINING PROGRAM

## 2023-05-18 PROCEDURE — 3074F SYST BP LT 130 MM HG: CPT | Performed by: STUDENT IN AN ORGANIZED HEALTH CARE EDUCATION/TRAINING PROGRAM

## 2023-05-18 PROCEDURE — 3078F DIAST BP <80 MM HG: CPT | Performed by: STUDENT IN AN ORGANIZED HEALTH CARE EDUCATION/TRAINING PROGRAM

## 2023-05-18 RX ORDER — PYRIDOXINE HCL (VITAMIN B6) 100 MG
500 TABLET ORAL
COMMUNITY

## 2023-05-18 RX ORDER — FLUTICASONE PROPIONATE 50 MCG
2 SPRAY, SUSPENSION (ML) NASAL DAILY
Qty: 48 G | Refills: 1 | Status: SHIPPED | OUTPATIENT
Start: 2023-05-18

## 2023-05-18 SDOH — ECONOMIC STABILITY: INCOME INSECURITY: HOW HARD IS IT FOR YOU TO PAY FOR THE VERY BASICS LIKE FOOD, HOUSING, MEDICAL CARE, AND HEATING?: PATIENT DECLINED

## 2023-05-18 ASSESSMENT — ENCOUNTER SYMPTOMS
WHEEZING: 0
SHORTNESS OF BREATH: 0
COUGH: 1

## 2023-05-18 NOTE — PROGRESS NOTES
Radha Cole  57 y.o. female  1960  [address]  [id number]     [address]        Chief Complaint: chronic cough  Source: self, the medical record and     Subjective  Radha Cole is an 57 y.o. female who presents for:    Radha Cole is an 57 y.o. female who presents for cough. This is a chronic issue. Sees ENT, GI - started on omeprazole, has known HH. Saw allergy - has started with allergy covers and all of their recommendations. Has had evaluation by pulm with normal CT chest 8/2022, normal PFTs. States that the cough \"got a little better\" after seeing each specialist. She was taking alvesco but not taking it any longer. She stopped it because it made her hoarse - has been on and off for 2 years. Thyroid Nodules: s/p bx, nonmalignant    HTN: on atenolol-chlorthalidone 50-25mg, BP at home runs 1     HLD: xocor 40mg qhs, no muscle aches        Review of Systems   Respiratory:  Positive for cough. Negative for shortness of breath and wheezing. Endocrine: Negative for cold intolerance and heat intolerance. Musculoskeletal:  Negative for myalgias. Allergic/Immunologic: Positive for environmental allergies. Allergies - reviewed:    Allergies   Allergen Reactions    Penicillins Swelling         Medications - reviewed:   Current Outpatient Medications   Medication Sig    Cranberry 500 MG CAPS Take 1 capsule by mouth    fluticasone (FLONASE) 50 MCG/ACT nasal spray 2 sprays by Each Nostril route daily    Flaxseed Oil OIL by Other route    atenolol-chlorthalidone (TENORETIC) 50-25 MG per tablet Take 0.5 tablets by mouth daily    vitamin D3 (CHOLECALCIFEROL) 125 MCG (5000 UT) TABS tablet Take 1 tablet by mouth every other day    omeprazole (PRILOSEC) 20 MG delayed release capsule Take 1 capsule by mouth 2 times daily    potassium chloride (KLOR-CON M) 20 MEQ extended release tablet Take 2 tablets by mouth daily    simvastatin (ZOCOR) 40 MG

## 2023-08-01 NOTE — TELEPHONE ENCOUNTER
----- Message from Kasi Vicente sent at 7/28/2023  8:09 PM EDT -----  Regarding: Omeprazole  Contact: [phone]  I need a refill on the omeprazole 20 MG delayed release capsule  2x per day. Please send the request to the [address] [place] [address] at [address].     Thanks,  Yordy Chpshire

## 2023-08-01 NOTE — TELEPHONE ENCOUNTER
PCP: Mc Pérez MD    Last appt: 5/18/2023     Future Appointments   Date Time Provider [address]   9/5/2023  8:20 AM Alber Cedeño MD [facility] BS AMB       Requested Prescriptions     Pending Prescriptions Disp Refills    omeprazole (PRILOSEC) 20 MG delayed release capsule 60 capsule 0     Sig: Take 1 capsule by mouth 2 times daily       Prior labs and Blood pressures:  BP Readings from Last 3 Encounters:   05/18/23 102/70   02/03/23 110/72   08/03/22 100/73     Lab Results   Component Value Date/Time     02/03/2023 09:53 AM    K 4.4 02/03/2023 09:53 AM     02/03/2023 09:53 AM    CO2 34 02/03/2023 09:53 AM    BUN 10 02/03/2023 09:53 AM    GFRAA >60 08/03/2022 10:47 AM     No results found for: HBA1C, [id number]  Lab Results   Component Value Date/Time    CHOL 191 02/03/2023 09:53 AM    HDL 59 02/03/2023 09:53 AM     No results found for: VITD3, VD3RIA    Lab Results   Component Value Date/Time    TSH 1.29 02/03/2023 09:53 AM

## 2023-08-02 RX ORDER — OMEPRAZOLE 20 MG/1
20 CAPSULE, DELAYED RELEASE ORAL 2 TIMES DAILY
Qty: 60 CAPSULE | Refills: 0 | Status: SHIPPED | OUTPATIENT
Start: 2023-08-02

## 2023-09-04 SDOH — HEALTH STABILITY: PHYSICAL HEALTH: ON AVERAGE, HOW MANY DAYS PER WEEK DO YOU ENGAGE IN MODERATE TO STRENUOUS EXERCISE (LIKE A BRISK WALK)?: 4 DAYS

## 2023-09-04 SDOH — HEALTH STABILITY: PHYSICAL HEALTH: ON AVERAGE, HOW MANY MINUTES DO YOU ENGAGE IN EXERCISE AT THIS LEVEL?: 60 MIN

## 2023-09-04 ASSESSMENT — PATIENT HEALTH QUESTIONNAIRE - PHQ9
1. LITTLE INTEREST OR PLEASURE IN DOING THINGS: 0
SUM OF ALL RESPONSES TO PHQ QUESTIONS 1-9: 0
SUM OF ALL RESPONSES TO PHQ QUESTIONS 1-9: 0
SUM OF ALL RESPONSES TO PHQ9 QUESTIONS 1 & 2: 0
SUM OF ALL RESPONSES TO PHQ QUESTIONS 1-9: 0
2. FEELING DOWN, DEPRESSED OR HOPELESS: 0
SUM OF ALL RESPONSES TO PHQ QUESTIONS 1-9: 0

## 2023-09-04 ASSESSMENT — LIFESTYLE VARIABLES
HOW MANY STANDARD DRINKS CONTAINING ALCOHOL DO YOU HAVE ON A TYPICAL DAY: 1 OR 2
HOW OFTEN DO YOU HAVE SIX OR MORE DRINKS ON ONE OCCASION: 1
HOW OFTEN DO YOU HAVE A DRINK CONTAINING ALCOHOL: MONTHLY OR LESS
HOW MANY STANDARD DRINKS CONTAINING ALCOHOL DO YOU HAVE ON A TYPICAL DAY: 1
HOW OFTEN DO YOU HAVE A DRINK CONTAINING ALCOHOL: 2

## 2023-09-05 ENCOUNTER — OFFICE VISIT (OUTPATIENT)
Age: 63
End: 2023-09-05
Payer: MEDICARE

## 2023-09-05 VITALS
WEIGHT: 190.4 LBS | HEART RATE: 66 BPM | RESPIRATION RATE: 18 BRPM | SYSTOLIC BLOOD PRESSURE: 102 MMHG | HEIGHT: 61 IN | TEMPERATURE: 98.1 F | OXYGEN SATURATION: 96 % | BODY MASS INDEX: 35.95 KG/M2 | DIASTOLIC BLOOD PRESSURE: 78 MMHG

## 2023-09-05 DIAGNOSIS — R05.3 CHRONIC COUGH: ICD-10-CM

## 2023-09-05 DIAGNOSIS — Z00.00 WELCOME TO MEDICARE PREVENTIVE VISIT: Primary | ICD-10-CM

## 2023-09-05 DIAGNOSIS — Z71.89 ADVANCED CARE PLANNING/COUNSELING DISCUSSION: ICD-10-CM

## 2023-09-05 DIAGNOSIS — I10 ESSENTIAL HYPERTENSION: ICD-10-CM

## 2023-09-05 DIAGNOSIS — E78.00 HYPERCHOLESTEREMIA: ICD-10-CM

## 2023-09-05 DIAGNOSIS — Z23 ENCOUNTER FOR IMMUNIZATION: ICD-10-CM

## 2023-09-05 PROBLEM — R79.89 ELEVATED LFTS: Status: RESOLVED | Noted: 2020-01-30 | Resolved: 2023-09-05

## 2023-09-05 PROCEDURE — 3078F DIAST BP <80 MM HG: CPT | Performed by: STUDENT IN AN ORGANIZED HEALTH CARE EDUCATION/TRAINING PROGRAM

## 2023-09-05 PROCEDURE — G8427 DOCREV CUR MEDS BY ELIG CLIN: HCPCS | Performed by: STUDENT IN AN ORGANIZED HEALTH CARE EDUCATION/TRAINING PROGRAM

## 2023-09-05 PROCEDURE — G8400 PT W/DXA NO RESULTS DOC: HCPCS | Performed by: STUDENT IN AN ORGANIZED HEALTH CARE EDUCATION/TRAINING PROGRAM

## 2023-09-05 PROCEDURE — G0447 BEHAVIOR COUNSEL OBESITY 15M: HCPCS | Performed by: STUDENT IN AN ORGANIZED HEALTH CARE EDUCATION/TRAINING PROGRAM

## 2023-09-05 PROCEDURE — 3017F COLORECTAL CA SCREEN DOC REV: CPT | Performed by: STUDENT IN AN ORGANIZED HEALTH CARE EDUCATION/TRAINING PROGRAM

## 2023-09-05 PROCEDURE — 90732 PPSV23 VACC 2 YRS+ SUBQ/IM: CPT | Performed by: STUDENT IN AN ORGANIZED HEALTH CARE EDUCATION/TRAINING PROGRAM

## 2023-09-05 PROCEDURE — 90471 IMMUNIZATION ADMIN: CPT | Performed by: STUDENT IN AN ORGANIZED HEALTH CARE EDUCATION/TRAINING PROGRAM

## 2023-09-05 PROCEDURE — 99214 OFFICE O/P EST MOD 30 MIN: CPT | Performed by: STUDENT IN AN ORGANIZED HEALTH CARE EDUCATION/TRAINING PROGRAM

## 2023-09-05 PROCEDURE — 99497 ADVNCD CARE PLAN 30 MIN: CPT | Performed by: STUDENT IN AN ORGANIZED HEALTH CARE EDUCATION/TRAINING PROGRAM

## 2023-09-05 PROCEDURE — 90694 VACC AIIV4 NO PRSRV 0.5ML IM: CPT | Performed by: STUDENT IN AN ORGANIZED HEALTH CARE EDUCATION/TRAINING PROGRAM

## 2023-09-05 PROCEDURE — 1036F TOBACCO NON-USER: CPT | Performed by: STUDENT IN AN ORGANIZED HEALTH CARE EDUCATION/TRAINING PROGRAM

## 2023-09-05 PROCEDURE — 1123F ACP DISCUSS/DSCN MKR DOCD: CPT | Performed by: STUDENT IN AN ORGANIZED HEALTH CARE EDUCATION/TRAINING PROGRAM

## 2023-09-05 PROCEDURE — G8417 CALC BMI ABV UP PARAM F/U: HCPCS | Performed by: STUDENT IN AN ORGANIZED HEALTH CARE EDUCATION/TRAINING PROGRAM

## 2023-09-05 PROCEDURE — G0403 EKG FOR INITIAL PREVENT EXAM: HCPCS | Performed by: STUDENT IN AN ORGANIZED HEALTH CARE EDUCATION/TRAINING PROGRAM

## 2023-09-05 PROCEDURE — G0009 ADMIN PNEUMOCOCCAL VACCINE: HCPCS | Performed by: STUDENT IN AN ORGANIZED HEALTH CARE EDUCATION/TRAINING PROGRAM

## 2023-09-05 PROCEDURE — 1090F PRES/ABSN URINE INCON ASSESS: CPT | Performed by: STUDENT IN AN ORGANIZED HEALTH CARE EDUCATION/TRAINING PROGRAM

## 2023-09-05 PROCEDURE — G0008 ADMIN INFLUENZA VIRUS VAC: HCPCS | Performed by: STUDENT IN AN ORGANIZED HEALTH CARE EDUCATION/TRAINING PROGRAM

## 2023-09-05 PROCEDURE — PBSHW PBB SHADOW CHARGE: Performed by: STUDENT IN AN ORGANIZED HEALTH CARE EDUCATION/TRAINING PROGRAM

## 2023-09-05 PROCEDURE — 3074F SYST BP LT 130 MM HG: CPT | Performed by: STUDENT IN AN ORGANIZED HEALTH CARE EDUCATION/TRAINING PROGRAM

## 2023-09-05 PROCEDURE — G0402 INITIAL PREVENTIVE EXAM: HCPCS | Performed by: STUDENT IN AN ORGANIZED HEALTH CARE EDUCATION/TRAINING PROGRAM

## 2023-09-05 PROCEDURE — PBSHW INFLUENZA, FLUAD, (AGE 65 Y+), IM, PF, 0.5 ML: Performed by: STUDENT IN AN ORGANIZED HEALTH CARE EDUCATION/TRAINING PROGRAM

## 2023-09-05 PROCEDURE — PBSHW PNEUMOCOCCAL, PPSV23, PNEUMOVAX 23, (AGE 2 YRS+), SC/IM: Performed by: STUDENT IN AN ORGANIZED HEALTH CARE EDUCATION/TRAINING PROGRAM

## 2023-09-05 RX ORDER — POTASSIUM CHLORIDE 20 MEQ/1
40 TABLET, EXTENDED RELEASE ORAL DAILY
Qty: 180 TABLET | Refills: 1 | Status: SHIPPED | OUTPATIENT
Start: 2023-09-05

## 2023-09-05 RX ORDER — OMEPRAZOLE 20 MG/1
20 CAPSULE, DELAYED RELEASE ORAL 2 TIMES DAILY
Qty: 180 CAPSULE | Refills: 1 | Status: SHIPPED | OUTPATIENT
Start: 2023-09-05

## 2023-09-05 RX ORDER — CHLORTHALIDONE 25 MG/1
12.5 TABLET ORAL DAILY
Qty: 45 TABLET | Refills: 1 | Status: SHIPPED | OUTPATIENT
Start: 2023-09-05

## 2023-09-05 RX ORDER — SIMVASTATIN 40 MG
40 TABLET ORAL NIGHTLY
Qty: 90 TABLET | Refills: 3 | Status: SHIPPED | OUTPATIENT
Start: 2023-09-05

## 2023-09-05 NOTE — PROGRESS NOTES
Medicare Annual Wellness Visit    Cyn Campbell is here for Medicare AWV    Assessment & Plan   Welcome to Medicare preventive visit  Updated medical, surgical, family and social history and completed or discussed all age, gender and comorbidity-related screenings and placed appropriate orders c/w this discussion.   - will discuss DEXA with gyn  -     EKG - Welcome to Medicare (IPPE)  Essential hypertension: BP running on low side at home and in office. Decreased current regimen  - ambulatory BP 2x/week; f/u x 3 months with log   -    START chlorthalidone (HYGROTON) 25 MG tablet; Take 0.5 tablets by mouth daily, Disp-45 tablet, R-1Normal  - STOP atenolol   -     potassium chloride (KLOR-CON M) 20 MEQ extended release tablet; Take 2 tablets by mouth daily, Disp-180 tablet, R-1Normal  Hypercholesteremia: stable on zocor, lipid panel due 2/2024  -     simvastatin (ZOCOR) 40 MG tablet; Take 1 tablet by mouth nightly, Disp-90 tablet, R-3Normal  Encounter for immunization  -     Influenza, FLUAD, (age 72 y+), IM, Preservative Free, 0.5 mL  -     Pneumococcal, PPSV23, PNEUMOVAX 23, (age 2 yrs+), SC/IM  Chronic cough: stable on omeprazole  -    continue omeprazole (PRILOSEC) 20 MG delayed release capsule; Take 1 capsule by mouth 2 times daily, Disp-180 capsule, R-1Normal     Recommendations for Preventive Services Due: see orders and patient instructions/AVS.  Recommended screening schedule for the next 5-10 years is provided to the patient in written form: see Patient Instructions/AVS.     Return in about 3 months (around 12/5/2023) for blood pressure. Subjective   The following acute and/or chronic problems were also addressed today:  HTN: takes atenolol-chlorthalidone 50-25,g daily, BPs at home run /70s, CMP WNL in 2/2023.   HLD: on zocor 40mg WHS, no muscle aches, last LDL 92  Vit D Deficiency: takes 5000IU daily       Patient's complete Health Risk Assessment and screening values have been reviewed and are

## 2024-01-04 ENCOUNTER — OFFICE VISIT (OUTPATIENT)
Age: 64
End: 2024-01-04
Payer: MEDICARE

## 2024-01-04 VITALS
OXYGEN SATURATION: 99 % | WEIGHT: 189.4 LBS | HEIGHT: 61 IN | SYSTOLIC BLOOD PRESSURE: 111 MMHG | HEART RATE: 80 BPM | RESPIRATION RATE: 16 BRPM | TEMPERATURE: 98 F | DIASTOLIC BLOOD PRESSURE: 73 MMHG | BODY MASS INDEX: 35.76 KG/M2

## 2024-01-04 DIAGNOSIS — I10 ESSENTIAL HYPERTENSION: Primary | ICD-10-CM

## 2024-01-04 DIAGNOSIS — K29.50 CHRONIC GASTRITIS WITHOUT BLEEDING, UNSPECIFIED GASTRITIS TYPE: ICD-10-CM

## 2024-01-04 DIAGNOSIS — E78.00 HYPERCHOLESTEREMIA: ICD-10-CM

## 2024-01-04 DIAGNOSIS — E04.1 THYROID NODULE: ICD-10-CM

## 2024-01-04 PROCEDURE — G8484 FLU IMMUNIZE NO ADMIN: HCPCS | Performed by: STUDENT IN AN ORGANIZED HEALTH CARE EDUCATION/TRAINING PROGRAM

## 2024-01-04 PROCEDURE — 99214 OFFICE O/P EST MOD 30 MIN: CPT | Performed by: STUDENT IN AN ORGANIZED HEALTH CARE EDUCATION/TRAINING PROGRAM

## 2024-01-04 PROCEDURE — 1123F ACP DISCUSS/DSCN MKR DOCD: CPT | Performed by: STUDENT IN AN ORGANIZED HEALTH CARE EDUCATION/TRAINING PROGRAM

## 2024-01-04 PROCEDURE — 3017F COLORECTAL CA SCREEN DOC REV: CPT | Performed by: STUDENT IN AN ORGANIZED HEALTH CARE EDUCATION/TRAINING PROGRAM

## 2024-01-04 PROCEDURE — G8427 DOCREV CUR MEDS BY ELIG CLIN: HCPCS | Performed by: STUDENT IN AN ORGANIZED HEALTH CARE EDUCATION/TRAINING PROGRAM

## 2024-01-04 PROCEDURE — 3078F DIAST BP <80 MM HG: CPT | Performed by: STUDENT IN AN ORGANIZED HEALTH CARE EDUCATION/TRAINING PROGRAM

## 2024-01-04 PROCEDURE — 1036F TOBACCO NON-USER: CPT | Performed by: STUDENT IN AN ORGANIZED HEALTH CARE EDUCATION/TRAINING PROGRAM

## 2024-01-04 PROCEDURE — G8417 CALC BMI ABV UP PARAM F/U: HCPCS | Performed by: STUDENT IN AN ORGANIZED HEALTH CARE EDUCATION/TRAINING PROGRAM

## 2024-01-04 PROCEDURE — 1090F PRES/ABSN URINE INCON ASSESS: CPT | Performed by: STUDENT IN AN ORGANIZED HEALTH CARE EDUCATION/TRAINING PROGRAM

## 2024-01-04 PROCEDURE — 3074F SYST BP LT 130 MM HG: CPT | Performed by: STUDENT IN AN ORGANIZED HEALTH CARE EDUCATION/TRAINING PROGRAM

## 2024-01-04 PROCEDURE — G8400 PT W/DXA NO RESULTS DOC: HCPCS | Performed by: STUDENT IN AN ORGANIZED HEALTH CARE EDUCATION/TRAINING PROGRAM

## 2024-01-04 RX ORDER — CHLORTHALIDONE 25 MG/1
12.5 TABLET ORAL DAILY
Qty: 45 TABLET | Refills: 1 | Status: SHIPPED | OUTPATIENT
Start: 2024-01-04

## 2024-01-04 RX ORDER — SIMVASTATIN 40 MG
40 TABLET ORAL NIGHTLY
Qty: 90 TABLET | Refills: 3 | Status: SHIPPED | OUTPATIENT
Start: 2024-01-04

## 2024-01-04 RX ORDER — OMEPRAZOLE 40 MG/1
40 CAPSULE, DELAYED RELEASE ORAL
Qty: 90 CAPSULE | Refills: 1 | Status: SHIPPED | OUTPATIENT
Start: 2024-01-04

## 2024-01-04 RX ORDER — POTASSIUM CHLORIDE 20 MEQ/1
40 TABLET, EXTENDED RELEASE ORAL DAILY
Qty: 180 TABLET | Refills: 1 | Status: SHIPPED | OUTPATIENT
Start: 2024-01-04

## 2024-01-04 ASSESSMENT — PATIENT HEALTH QUESTIONNAIRE - PHQ9
SUM OF ALL RESPONSES TO PHQ9 QUESTIONS 1 & 2: 0
2. FEELING DOWN, DEPRESSED OR HOPELESS: 0
SUM OF ALL RESPONSES TO PHQ QUESTIONS 1-9: 0
SUM OF ALL RESPONSES TO PHQ QUESTIONS 1-9: 0
1. LITTLE INTEREST OR PLEASURE IN DOING THINGS: 0
SUM OF ALL RESPONSES TO PHQ QUESTIONS 1-9: 0
SUM OF ALL RESPONSES TO PHQ QUESTIONS 1-9: 0

## 2024-01-04 NOTE — PATIENT INSTRUCTIONS
New Practice:  [facility]   [address], [facility] [place] [address] [phone]

## 2024-01-04 NOTE — PROGRESS NOTES
Chief Complaint   Patient presents with    Hypertension    Cholesterol Problem       \"Have you been to the ER, urgent care clinic since your last visit?  Hospitalized since your last visit?\"    NO    “Have you seen or consulted any other health care providers outside of [facility] since your last visit?”    YES - When: approximately 3 months ago.  Where and Why: dr varela gyn.       Have you had a mammogram?”   YES - Where: [facility] Nurse/CMA to request most recent records if not in the chart            1/4/2024     1:43 PM   PHQ-9    Little interest or pleasure in doing things 0   Feeling down, depressed, or hopeless 0   PHQ-2 Score 0   PHQ-9 Total Score 0       Health Maintenance Due   Topic Date Due    HIV screen  Never done    Respiratory Syncytial Virus (RSV) Pregnant or age 60 yrs+ (1 - 1-dose 60+ series) Never done    Breast cancer screen  07/16/2023    COVID-19 Vaccine (2 - 2023-24 season) 09/01/2023    A1C test (Diabetic or Prediabetic)  02/03/2024    Lipids  02/03/2024

## 2024-01-04 NOTE — PROGRESS NOTES
Margaux Vicente  63 y.o. female  1960  [address] [address]  [id number]     [facility]       Chief Complaint:  Chief Complaint   Patient presents with    Hypertension    Cholesterol Problem   Source: self, the medical record     Subjective  Margaux Vicente is an 63 y.o. female who presents for follwoup for:    HTN  - takes Chlorthalidone 12.5mg daily, atenolol stopped at last visit   - does have BP cuff at home  - readings run 120s has been as low as 105 whe lying on couch in the even, bottom number no higher than 80s  - does feel a little lightheaded when going up and down stairs - does have chronic tinnitus in both ears - has upcoming visit with Dr Dumont  - neeraj CP, palpitations, SOB, lower extremity edema, HA, dizziness, cough     Thyroid nodules: followed by ENT, Zulema Dumont (follwoup next month). Initial thyroid US with multiple small nodules diffusely, no further imaging needed based on guidelines at time of thyroid US    HLD: on simvastatin, last LDL 92.2 2/2023      ROS  Negative except what is mentioned in HPI        Allergies - reviewed:   Allergies   Allergen Reactions    Penicillins Swelling         Medications - reviewed:   Current Outpatient Medications   Medication Sig    TURMERIC PO Take by mouth    chlorthalidone (HYGROTON) 25 MG tablet Take 0.5 tablets by mouth daily    potassium chloride (KLOR-CON M) 20 MEQ extended release tablet Take 2 tablets by mouth daily    simvastatin (ZOCOR) 40 MG tablet Take 1 tablet by mouth nightly    omeprazole (PRILOSEC) 40 MG delayed release capsule Take 1 capsule by mouth every morning (before breakfast)    Probiotic Product (PROBIOTIC-10 PO) Take by mouth    Cranberry 500 MG CAPS Take 1 capsule by mouth    fluticasone (FLONASE) 50 MCG/ACT nasal spray 2 sprays by Each Nostril route daily    Flaxseed Oil OIL by Other route    vitamin D3 (CHOLECALCIFEROL) 125 MCG (5000 UT) TABS tablet Take 1 tablet by mouth every other

## 2024-01-05 LAB
ALBUMIN SERPL-MCNC: 3.8 G/DL (ref 3.5–5)
ALBUMIN/GLOB SERPL: 1.1 (ref 1.1–2.2)
ALP SERPL-CCNC: 104 U/L (ref 45–117)
ALT SERPL-CCNC: 50 U/L (ref 12–78)
ANION GAP SERPL CALC-SCNC: 3 MMOL/L (ref 5–15)
AST SERPL-CCNC: 41 U/L (ref 15–37)
BILIRUB SERPL-MCNC: 0.5 MG/DL (ref 0.2–1)
BUN SERPL-MCNC: 17 MG/DL (ref 6–20)
BUN/CREAT SERPL: 24 (ref 12–20)
CALCIUM SERPL-MCNC: 8.5 MG/DL (ref 8.5–10.1)
CHLORIDE SERPL-SCNC: 103 MMOL/L (ref 97–108)
CHOLEST SERPL-MCNC: 182 MG/DL
CO2 SERPL-SCNC: 33 MMOL/L (ref 21–32)
CREAT SERPL-MCNC: 0.7 MG/DL (ref 0.55–1.02)
GLOBULIN SER CALC-MCNC: 3.5 G/DL (ref 2–4)
GLUCOSE SERPL-MCNC: 100 MG/DL (ref 65–100)
HDLC SERPL-MCNC: 67 MG/DL
HDLC SERPL: 2.7 (ref 0–5)
LDLC SERPL CALC-MCNC: 80 MG/DL (ref 0–100)
POTASSIUM SERPL-SCNC: 4.4 MMOL/L (ref 3.5–5.1)
PROT SERPL-MCNC: 7.3 G/DL (ref 6.4–8.2)
SODIUM SERPL-SCNC: 139 MMOL/L (ref 136–145)
TRIGL SERPL-MCNC: 175 MG/DL
TSH SERPL DL<=0.05 MIU/L-ACNC: 0.99 UIU/ML (ref 0.36–3.74)
VLDLC SERPL CALC-MCNC: 35 MG/DL

## 2024-02-29 ENCOUNTER — HOSPITAL ENCOUNTER (OUTPATIENT)
Facility: HOSPITAL | Age: 64
Discharge: HOME | End: 2024-02-29
Attending: OTOLARYNGOLOGY
Payer: MEDICARE

## 2024-02-29 DIAGNOSIS — D34 THYROID ADENOMA: ICD-10-CM

## 2024-02-29 PROCEDURE — 76536 US EXAM OF HEAD AND NECK: CPT

## 2024-07-02 ENCOUNTER — OFFICE VISIT (OUTPATIENT)
Age: 64
End: 2024-07-02
Payer: MEDICARE

## 2024-07-02 VITALS
BODY MASS INDEX: 33.99 KG/M2 | RESPIRATION RATE: 16 BRPM | HEIGHT: 61 IN | DIASTOLIC BLOOD PRESSURE: 86 MMHG | TEMPERATURE: 97.5 F | HEART RATE: 88 BPM | WEIGHT: 180 LBS | OXYGEN SATURATION: 94 % | SYSTOLIC BLOOD PRESSURE: 131 MMHG

## 2024-07-02 DIAGNOSIS — I10 ESSENTIAL HYPERTENSION: Primary | ICD-10-CM

## 2024-07-02 DIAGNOSIS — R74.01 ELEVATED AST (SGOT): ICD-10-CM

## 2024-07-02 DIAGNOSIS — M79.643 INTERMITTENT PAIN AND SWELLING OF HAND: ICD-10-CM

## 2024-07-02 DIAGNOSIS — M79.89 INTERMITTENT PAIN AND SWELLING OF HAND: ICD-10-CM

## 2024-07-02 DIAGNOSIS — I10 ESSENTIAL HYPERTENSION: ICD-10-CM

## 2024-07-02 LAB
ALBUMIN SERPL-MCNC: 3.8 G/DL (ref 3.5–5)
ALBUMIN/GLOB SERPL: 1.1 (ref 1.1–2.2)
ALP SERPL-CCNC: 83 U/L (ref 45–117)
ALT SERPL-CCNC: 59 U/L (ref 12–78)
ANION GAP SERPL CALC-SCNC: 6 MMOL/L (ref 5–15)
AST SERPL-CCNC: 30 U/L (ref 15–37)
BILIRUB SERPL-MCNC: 0.4 MG/DL (ref 0.2–1)
BUN SERPL-MCNC: 17 MG/DL (ref 6–20)
BUN/CREAT SERPL: 21 (ref 12–20)
CALCIUM SERPL-MCNC: 9.4 MG/DL (ref 8.5–10.1)
CHLORIDE SERPL-SCNC: 102 MMOL/L (ref 97–108)
CO2 SERPL-SCNC: 32 MMOL/L (ref 21–32)
CREAT SERPL-MCNC: 0.8 MG/DL (ref 0.55–1.02)
GLOBULIN SER CALC-MCNC: 3.5 G/DL (ref 2–4)
GLUCOSE SERPL-MCNC: 100 MG/DL (ref 65–100)
POTASSIUM SERPL-SCNC: 4 MMOL/L (ref 3.5–5.1)
PROT SERPL-MCNC: 7.3 G/DL (ref 6.4–8.2)
SODIUM SERPL-SCNC: 140 MMOL/L (ref 136–145)

## 2024-07-02 PROCEDURE — G8400 PT W/DXA NO RESULTS DOC: HCPCS | Performed by: STUDENT IN AN ORGANIZED HEALTH CARE EDUCATION/TRAINING PROGRAM

## 2024-07-02 PROCEDURE — 1090F PRES/ABSN URINE INCON ASSESS: CPT | Performed by: STUDENT IN AN ORGANIZED HEALTH CARE EDUCATION/TRAINING PROGRAM

## 2024-07-02 PROCEDURE — 3079F DIAST BP 80-89 MM HG: CPT | Performed by: STUDENT IN AN ORGANIZED HEALTH CARE EDUCATION/TRAINING PROGRAM

## 2024-07-02 PROCEDURE — 1036F TOBACCO NON-USER: CPT | Performed by: STUDENT IN AN ORGANIZED HEALTH CARE EDUCATION/TRAINING PROGRAM

## 2024-07-02 PROCEDURE — 3075F SYST BP GE 130 - 139MM HG: CPT | Performed by: STUDENT IN AN ORGANIZED HEALTH CARE EDUCATION/TRAINING PROGRAM

## 2024-07-02 PROCEDURE — 99214 OFFICE O/P EST MOD 30 MIN: CPT | Performed by: STUDENT IN AN ORGANIZED HEALTH CARE EDUCATION/TRAINING PROGRAM

## 2024-07-02 PROCEDURE — G8427 DOCREV CUR MEDS BY ELIG CLIN: HCPCS | Performed by: STUDENT IN AN ORGANIZED HEALTH CARE EDUCATION/TRAINING PROGRAM

## 2024-07-02 PROCEDURE — G2211 COMPLEX E/M VISIT ADD ON: HCPCS | Performed by: STUDENT IN AN ORGANIZED HEALTH CARE EDUCATION/TRAINING PROGRAM

## 2024-07-02 PROCEDURE — 1123F ACP DISCUSS/DSCN MKR DOCD: CPT | Performed by: STUDENT IN AN ORGANIZED HEALTH CARE EDUCATION/TRAINING PROGRAM

## 2024-07-02 PROCEDURE — 3017F COLORECTAL CA SCREEN DOC REV: CPT | Performed by: STUDENT IN AN ORGANIZED HEALTH CARE EDUCATION/TRAINING PROGRAM

## 2024-07-02 PROCEDURE — G8417 CALC BMI ABV UP PARAM F/U: HCPCS | Performed by: STUDENT IN AN ORGANIZED HEALTH CARE EDUCATION/TRAINING PROGRAM

## 2024-07-02 RX ORDER — CHLORTHALIDONE 25 MG/1
12.5 TABLET ORAL DAILY
Qty: 45 TABLET | Refills: 0 | Status: SHIPPED | OUTPATIENT
Start: 2024-07-02

## 2024-07-02 RX ORDER — POTASSIUM CHLORIDE 20 MEQ/1
40 TABLET, EXTENDED RELEASE ORAL DAILY
Qty: 180 TABLET | Refills: 0 | Status: SHIPPED | OUTPATIENT
Start: 2024-07-02

## 2024-07-02 SDOH — ECONOMIC STABILITY: FOOD INSECURITY: WITHIN THE PAST 12 MONTHS, THE FOOD YOU BOUGHT JUST DIDN'T LAST AND YOU DIDN'T HAVE MONEY TO GET MORE.: NEVER TRUE

## 2024-07-02 SDOH — ECONOMIC STABILITY: FOOD INSECURITY: WITHIN THE PAST 12 MONTHS, YOU WORRIED THAT YOUR FOOD WOULD RUN OUT BEFORE YOU GOT MONEY TO BUY MORE.: NEVER TRUE

## 2024-07-02 SDOH — ECONOMIC STABILITY: INCOME INSECURITY: HOW HARD IS IT FOR YOU TO PAY FOR THE VERY BASICS LIKE FOOD, HOUSING, MEDICAL CARE, AND HEATING?: NOT HARD AT ALL

## 2024-07-02 ASSESSMENT — PATIENT HEALTH QUESTIONNAIRE - PHQ9
SUM OF ALL RESPONSES TO PHQ QUESTIONS 1-9: 0
1. LITTLE INTEREST OR PLEASURE IN DOING THINGS: NOT AT ALL
2. FEELING DOWN, DEPRESSED OR HOPELESS: NOT AT ALL
SUM OF ALL RESPONSES TO PHQ QUESTIONS 1-9: 0
SUM OF ALL RESPONSES TO PHQ QUESTIONS 1-9: 0
SUM OF ALL RESPONSES TO PHQ9 QUESTIONS 1 & 2: 0
SUM OF ALL RESPONSES TO PHQ QUESTIONS 1-9: 0

## 2024-07-02 NOTE — PROGRESS NOTES
Chief Complaint   Patient presents with    Joint Pain     Swelling mostly in hands but also in shoulder,neck and knees      \"Have you been to the ER, urgent care clinic since your last visit?  Hospitalized since your last visit?\"    NO    “Have you seen or consulted any other health care providers outside of [facility] since your last visit?”    NO            Click Here for Release of Records Request   
Hypercholesteremia 1/20/2020    Severe obesity (HCC) 1/20/2020    Vitamin D deficiency 1/20/2020         Past Surgical History - reviewed:   Past Surgical History:   Procedure Laterality Date    HYSTERECTOMY (CERVIX STATUS UNKNOWN)      KNEE ARTHROSCOPY Left     SHOULDER ARTHROSCOPY Right          Social History - reviewed:  Social History     Socioeconomic History    Marital status: Single     Spouse name: Not on file    Number of children: Not on file    Years of education: Not on file    Highest education level: Not on file   Occupational History    Not on file   Tobacco Use    Smoking status: Never     Passive exposure: Never    Smokeless tobacco: Never   Vaping Use    Vaping Use: Never used   Substance and Sexual Activity    Alcohol use: Not Currently    Drug use: Never    Sexual activity: Not Currently   Other Topics Concern    Not on file   Social History Narrative    Not on file     Social Determinants of Health     Financial Resource Strain: Low Risk  (7/2/2024)    Overall Financial Resource Strain (CARDIA)     Difficulty of Paying Living Expenses: Not hard at all   Food Insecurity: No Food Insecurity (7/2/2024)    Hunger Vital Sign     Worried About Running Out of Food in the Last Year: Never true     Ran Out of Food in the Last Year: Never true   Transportation Needs: Unknown (7/2/2024)    PRAPARE - Transportation     Lack of Transportation (Medical): Not on file     Lack of Transportation (Non-Medical): No   Physical Activity: Sufficiently Active (9/4/2023)    Exercise Vital Sign     Days of Exercise per Week: 4 days     Minutes of Exercise per Session: 60 min   Stress: Not on file   Social Connections: Not on file   Intimate Partner Violence: Not on file   Housing Stability: Unknown (7/2/2024)    Housing Stability Vital Sign     Unable to Pay for Housing in the Last Year: Not on file     Number of Places Lived in the Last Year: Not on file     Unstable Housing in the Last Year: No         Family History

## 2024-07-10 DIAGNOSIS — R05.3 CHRONIC COUGH: ICD-10-CM

## 2024-07-12 DIAGNOSIS — K29.50 CHRONIC GASTRITIS WITHOUT BLEEDING, UNSPECIFIED GASTRITIS TYPE: ICD-10-CM

## 2024-07-12 RX ORDER — OMEPRAZOLE 40 MG/1
40 CAPSULE, DELAYED RELEASE ORAL
Qty: 90 CAPSULE | Refills: 1 | OUTPATIENT
Start: 2024-07-12

## 2024-07-19 DIAGNOSIS — K29.50 CHRONIC GASTRITIS WITHOUT BLEEDING, UNSPECIFIED GASTRITIS TYPE: ICD-10-CM

## 2024-07-19 NOTE — TELEPHONE ENCOUNTER
Forwarded to provider for approval. Last Visit:07/02/2024 Next Visit: 09/06/2024      Received the following Auterrat message from patient:    I sent a refill request last week and haven't heard back.  It was for the 40mg of omeprazole once a day but I would like it as 20mg twice a day sent to the [facility] pharmacy at [address], [place].     Thanks,  Margaux

## 2024-07-22 RX ORDER — OMEPRAZOLE 20 MG/1
20 CAPSULE, DELAYED RELEASE ORAL 2 TIMES DAILY
Qty: 180 CAPSULE | Refills: 0 | Status: SHIPPED | OUTPATIENT
Start: 2024-07-22

## 2024-08-25 DIAGNOSIS — I10 ESSENTIAL HYPERTENSION: ICD-10-CM

## 2024-08-27 RX ORDER — CHLORTHALIDONE 25 MG/1
12.5 TABLET ORAL DAILY
Qty: 45 TABLET | Refills: 0 | Status: SHIPPED | OUTPATIENT
Start: 2024-08-27

## 2024-08-27 RX ORDER — POTASSIUM CHLORIDE 1500 MG/1
40 TABLET, EXTENDED RELEASE ORAL DAILY
Qty: 180 TABLET | Refills: 0 | Status: SHIPPED | OUTPATIENT
Start: 2024-08-27

## 2024-08-27 NOTE — TELEPHONE ENCOUNTER
Last appointment: 7/2/24  Next appointment: 9/6/24  Previous refill encounter(s): 7/2/24 90 d/s    Requested Prescriptions     Pending Prescriptions Disp Refills    potassium chloride (KLOR-CON M) 20 MEQ extended release tablet [Pharmacy Med Name: POTASSIUM CL 20MEQ ER TABLETS] 180 tablet 1     Sig: TAKE 2 TABLETS BY MOUTH DAILY    chlorthalidone (HYGROTON) 25 MG tablet [Pharmacy Med Name: CHLORTHALIDONE 25MG TABLETS] 45 tablet 1     Sig: TAKE 1/2 TABLET BY MOUTH DAILY         For Pharmacy Admin Tracking Only    Program: Medication Refill  CPA in place:    Recommendation Provided To:   Intervention Detail: New Rx: 2, reason: Patient Preference  Intervention Accepted By:   Gap Closed?:    Time Spent (min): 5

## 2024-09-03 SDOH — HEALTH STABILITY: PHYSICAL HEALTH: ON AVERAGE, HOW MANY MINUTES DO YOU ENGAGE IN EXERCISE AT THIS LEVEL?: 30 MIN

## 2024-09-03 SDOH — HEALTH STABILITY: PHYSICAL HEALTH: ON AVERAGE, HOW MANY DAYS PER WEEK DO YOU ENGAGE IN MODERATE TO STRENUOUS EXERCISE (LIKE A BRISK WALK)?: 5 DAYS

## 2024-09-03 ASSESSMENT — PATIENT HEALTH QUESTIONNAIRE - PHQ9
2. FEELING DOWN, DEPRESSED OR HOPELESS: NOT AT ALL
SUM OF ALL RESPONSES TO PHQ QUESTIONS 1-9: 0
SUM OF ALL RESPONSES TO PHQ QUESTIONS 1-9: 0
SUM OF ALL RESPONSES TO PHQ9 QUESTIONS 1 & 2: 0
SUM OF ALL RESPONSES TO PHQ QUESTIONS 1-9: 0
1. LITTLE INTEREST OR PLEASURE IN DOING THINGS: NOT AT ALL
SUM OF ALL RESPONSES TO PHQ QUESTIONS 1-9: 0

## 2024-09-03 ASSESSMENT — LIFESTYLE VARIABLES
HOW OFTEN DO YOU HAVE A DRINK CONTAINING ALCOHOL: 2-4 TIMES A MONTH
HOW OFTEN DO YOU HAVE A DRINK CONTAINING ALCOHOL: 3
HOW MANY STANDARD DRINKS CONTAINING ALCOHOL DO YOU HAVE ON A TYPICAL DAY: 1 OR 2
HOW OFTEN DO YOU HAVE SIX OR MORE DRINKS ON ONE OCCASION: 1
HOW MANY STANDARD DRINKS CONTAINING ALCOHOL DO YOU HAVE ON A TYPICAL DAY: 1

## 2024-09-06 ENCOUNTER — OFFICE VISIT (OUTPATIENT)
Age: 64
End: 2024-09-06
Payer: MEDICARE

## 2024-09-06 VITALS
TEMPERATURE: 97.5 F | HEIGHT: 61 IN | RESPIRATION RATE: 16 BRPM | SYSTOLIC BLOOD PRESSURE: 112 MMHG | OXYGEN SATURATION: 97 % | WEIGHT: 177.6 LBS | HEART RATE: 78 BPM | DIASTOLIC BLOOD PRESSURE: 66 MMHG | BODY MASS INDEX: 33.53 KG/M2

## 2024-09-06 DIAGNOSIS — Z00.00 INITIAL MEDICARE ANNUAL WELLNESS VISIT: ICD-10-CM

## 2024-09-06 DIAGNOSIS — Z00.00 INITIAL MEDICARE ANNUAL WELLNESS VISIT: Primary | ICD-10-CM

## 2024-09-06 DIAGNOSIS — E78.00 HYPERCHOLESTEREMIA: ICD-10-CM

## 2024-09-06 DIAGNOSIS — Z71.89 ADVANCED CARE PLANNING/COUNSELING DISCUSSION: ICD-10-CM

## 2024-09-06 DIAGNOSIS — R73.09 OTHER ABNORMAL GLUCOSE: ICD-10-CM

## 2024-09-06 DIAGNOSIS — M19.042 PRIMARY OSTEOARTHRITIS OF BOTH HANDS: ICD-10-CM

## 2024-09-06 DIAGNOSIS — M19.041 PRIMARY OSTEOARTHRITIS OF BOTH HANDS: ICD-10-CM

## 2024-09-06 DIAGNOSIS — I10 ESSENTIAL HYPERTENSION: ICD-10-CM

## 2024-09-06 DIAGNOSIS — Z23 ENCOUNTER FOR IMMUNIZATION: ICD-10-CM

## 2024-09-06 DIAGNOSIS — E66.09 CLASS 1 OBESITY DUE TO EXCESS CALORIES WITHOUT SERIOUS COMORBIDITY WITH BODY MASS INDEX (BMI) OF 33.0 TO 33.9 IN ADULT: ICD-10-CM

## 2024-09-06 PROCEDURE — 99214 OFFICE O/P EST MOD 30 MIN: CPT | Performed by: STUDENT IN AN ORGANIZED HEALTH CARE EDUCATION/TRAINING PROGRAM

## 2024-09-06 PROCEDURE — 99497 ADVNCD CARE PLAN 30 MIN: CPT | Performed by: STUDENT IN AN ORGANIZED HEALTH CARE EDUCATION/TRAINING PROGRAM

## 2024-09-06 PROCEDURE — G0008 ADMIN INFLUENZA VIRUS VAC: HCPCS | Performed by: STUDENT IN AN ORGANIZED HEALTH CARE EDUCATION/TRAINING PROGRAM

## 2024-09-06 RX ORDER — CHLORTHALIDONE 25 MG/1
12.5 TABLET ORAL DAILY
Qty: 45 TABLET | Refills: 1 | Status: SHIPPED | OUTPATIENT
Start: 2024-09-06

## 2024-09-06 RX ORDER — SIMVASTATIN 40 MG
40 TABLET ORAL NIGHTLY
Qty: 90 TABLET | Refills: 3 | Status: SHIPPED | OUTPATIENT
Start: 2024-09-06

## 2024-09-06 RX ORDER — POTASSIUM CHLORIDE 1500 MG/1
40 TABLET, EXTENDED RELEASE ORAL DAILY
Qty: 180 TABLET | Refills: 1 | Status: SHIPPED | OUTPATIENT
Start: 2024-09-06

## 2024-09-06 ASSESSMENT — PATIENT HEALTH QUESTIONNAIRE - PHQ9
SUM OF ALL RESPONSES TO PHQ QUESTIONS 1-9: 0
1. LITTLE INTEREST OR PLEASURE IN DOING THINGS: NOT AT ALL
SUM OF ALL RESPONSES TO PHQ QUESTIONS 1-9: 0
SUM OF ALL RESPONSES TO PHQ9 QUESTIONS 1 & 2: 0
SUM OF ALL RESPONSES TO PHQ QUESTIONS 1-9: 0
2. FEELING DOWN, DEPRESSED OR HOPELESS: NOT AT ALL
SUM OF ALL RESPONSES TO PHQ QUESTIONS 1-9: 0

## 2024-09-06 ASSESSMENT — LIFESTYLE VARIABLES
HOW OFTEN DO YOU HAVE A DRINK CONTAINING ALCOHOL: 2-4 TIMES A MONTH
HOW MANY STANDARD DRINKS CONTAINING ALCOHOL DO YOU HAVE ON A TYPICAL DAY: 1 OR 2

## 2024-09-06 NOTE — PROGRESS NOTES
Medicare Annual Wellness Visit    Margaux Vicente is here for Medicare AWV    Assessment & Plan   Initial Medicare annual wellness visit  Updated past medical, social and family history and ordered labs, imaging, immunizations and screenings appropriate for age, gender and comorbid conditions per current USPSTF guidelines.   -     CBC; Future  -     Comprehensive Metabolic Panel; Future  -     Lipid Panel; Future  Essential hypertension: BP at goal in office and c/w ambulatory readings   -     Comprehensive Metabolic Panel; Future  -     potassium chloride (KLOR-CON M) 20 MEQ extended release tablet; Take 2 tablets by mouth daily, Disp-180 tablet, R-1Normal  -     chlorthalidone (HYGROTON) 25 MG tablet; Take 0.5 tablets by mouth daily, Disp-45 tablet, R-1Normal  Class 1 obesity due to excess calories without serious comorbidity with body mass index (BMI) of 33.0 to 33.9 in adult  -     Hemoglobin A1C; Future  Encounter for immunization  -     Influenza, FLUAD Trivalent, (age 65 y+), IM, Preservative Free, 0.5mL  Hypercholesteremia: LDL at goal on last blood panel   -     Lipid Panel; Future  -     simvastatin (ZOCOR) 40 MG tablet; Take 1 tablet by mouth nightly, Disp-90 tablet, R-3Normal  Other abnormal glucose  -     Hemoglobin A1C; Future  Primary Osteoarthritis of both hands  - C/w aleeve BID  - discussed rheumatoid testing today - declines   Advanced Care planning/counseling discussion      Recommendations for Preventive Services Due: see orders and patient instructions/AVS.  Recommended screening schedule for the next 5-10 years is provided to the patient in written form: see Patient Instructions/AVS.     Return in about 7 months (around 4/6/2025) for followup for blood pressure .     Subjective   The following acute and/or chronic problems were also addressed today:    HTN: - takes chlorthalidone 12.5mg daily + K  - does have BP cuff at home  - readings run 100-110s/ 60s  - denies CP, palpitations, SOB, lower

## 2024-09-06 NOTE — PROGRESS NOTES
Chief Complaint   Patient presents with    Medicare AWV      \"Have you been to the ER, urgent care clinic since your last visit?  Hospitalized since your last visit?\"    NO    “Have you seen or consulted any other health care providers outside our system since your last visit?”    NO    Have you had a mammogram?”   YES - Where: GYN Nurse/CMA to request most recent records if not in the chart    Date of last Mammogram: 8/29/2022         Click Here for Release of Records Request

## 2024-09-07 LAB
ALBUMIN SERPL-MCNC: 3.7 G/DL (ref 3.5–5)
ALBUMIN/GLOB SERPL: 1.3 (ref 1.1–2.2)
ALP SERPL-CCNC: 68 U/L (ref 45–117)
ALT SERPL-CCNC: 20 U/L (ref 12–78)
ANION GAP SERPL CALC-SCNC: 3 MMOL/L (ref 2–12)
AST SERPL-CCNC: 18 U/L (ref 15–37)
BILIRUB SERPL-MCNC: 0.4 MG/DL (ref 0.2–1)
BUN SERPL-MCNC: 15 MG/DL (ref 6–20)
BUN/CREAT SERPL: 20 (ref 12–20)
CALCIUM SERPL-MCNC: 9.1 MG/DL (ref 8.5–10.1)
CHLORIDE SERPL-SCNC: 103 MMOL/L (ref 97–108)
CHOLEST SERPL-MCNC: 160 MG/DL
CO2 SERPL-SCNC: 34 MMOL/L (ref 21–32)
CREAT SERPL-MCNC: 0.75 MG/DL (ref 0.55–1.02)
ERYTHROCYTE [DISTWIDTH] IN BLOOD BY AUTOMATED COUNT: 12.7 % (ref 11.5–14.5)
EST. AVERAGE GLUCOSE BLD GHB EST-MCNC: 105 MG/DL
GLOBULIN SER CALC-MCNC: 2.9 G/DL (ref 2–4)
GLUCOSE SERPL-MCNC: 99 MG/DL (ref 65–100)
HBA1C MFR BLD: 5.3 % (ref 4–5.6)
HCT VFR BLD AUTO: 40.1 % (ref 35–47)
HDLC SERPL-MCNC: 59 MG/DL
HDLC SERPL: 2.7 (ref 0–5)
HGB BLD-MCNC: 13 G/DL (ref 11.5–16)
LDLC SERPL CALC-MCNC: 76.4 MG/DL (ref 0–100)
MCH RBC QN AUTO: 30.1 PG (ref 26–34)
MCHC RBC AUTO-ENTMCNC: 32.4 G/DL (ref 30–36.5)
MCV RBC AUTO: 92.8 FL (ref 80–99)
NRBC # BLD: 0 K/UL (ref 0–0.01)
NRBC BLD-RTO: 0 PER 100 WBC
PLATELET # BLD AUTO: 220 K/UL (ref 150–400)
PMV BLD AUTO: 11.7 FL (ref 8.9–12.9)
POTASSIUM SERPL-SCNC: 3.9 MMOL/L (ref 3.5–5.1)
PROT SERPL-MCNC: 6.6 G/DL (ref 6.4–8.2)
RBC # BLD AUTO: 4.32 M/UL (ref 3.8–5.2)
SODIUM SERPL-SCNC: 140 MMOL/L (ref 136–145)
TRIGL SERPL-MCNC: 123 MG/DL
VLDLC SERPL CALC-MCNC: 24.6 MG/DL
WBC # BLD AUTO: 6.6 K/UL (ref 3.6–11)

## 2024-11-15 DIAGNOSIS — K29.50 CHRONIC GASTRITIS WITHOUT BLEEDING, UNSPECIFIED GASTRITIS TYPE: ICD-10-CM

## 2024-11-18 DIAGNOSIS — K29.50 CHRONIC GASTRITIS WITHOUT BLEEDING, UNSPECIFIED GASTRITIS TYPE: ICD-10-CM

## 2025-02-14 DIAGNOSIS — K29.50 CHRONIC GASTRITIS WITHOUT BLEEDING, UNSPECIFIED GASTRITIS TYPE: ICD-10-CM

## 2025-03-12 ENCOUNTER — TRANSCRIBE ORDERS (OUTPATIENT)
Facility: HOSPITAL | Age: 65
End: 2025-03-12

## 2025-03-12 DIAGNOSIS — D34 THYROID ADENOMA: Primary | ICD-10-CM

## 2025-03-19 ENCOUNTER — HOSPITAL ENCOUNTER (OUTPATIENT)
Facility: HOSPITAL | Age: 65
Discharge: HOME | End: 2025-03-22
Attending: OTOLARYNGOLOGY
Payer: MEDICARE

## 2025-03-19 DIAGNOSIS — D34 THYROID ADENOMA: ICD-10-CM

## 2025-03-19 PROCEDURE — 76536 US EXAM OF HEAD AND NECK: CPT

## 2025-04-06 SDOH — ECONOMIC STABILITY: FOOD INSECURITY: WITHIN THE PAST 12 MONTHS, YOU WORRIED THAT YOUR FOOD WOULD RUN OUT BEFORE YOU GOT MONEY TO BUY MORE.: NEVER TRUE

## 2025-04-06 SDOH — ECONOMIC STABILITY: INCOME INSECURITY: IN THE LAST 12 MONTHS, WAS THERE A TIME WHEN YOU WERE NOT ABLE TO PAY THE MORTGAGE OR RENT ON TIME?: NO

## 2025-04-06 SDOH — ECONOMIC STABILITY: TRANSPORTATION INSECURITY
IN THE PAST 12 MONTHS, HAS THE LACK OF TRANSPORTATION KEPT YOU FROM MEDICAL APPOINTMENTS OR FROM GETTING MEDICATIONS?: NO

## 2025-04-06 SDOH — ECONOMIC STABILITY: FOOD INSECURITY: WITHIN THE PAST 12 MONTHS, THE FOOD YOU BOUGHT JUST DIDN'T LAST AND YOU DIDN'T HAVE MONEY TO GET MORE.: NEVER TRUE

## 2025-04-09 ENCOUNTER — OFFICE VISIT (OUTPATIENT)
Age: 65
End: 2025-04-09
Payer: MEDICARE

## 2025-04-09 VITALS
SYSTOLIC BLOOD PRESSURE: 111 MMHG | HEART RATE: 78 BPM | TEMPERATURE: 97.5 F | OXYGEN SATURATION: 95 % | BODY MASS INDEX: 34.63 KG/M2 | RESPIRATION RATE: 16 BRPM | DIASTOLIC BLOOD PRESSURE: 64 MMHG | HEIGHT: 61 IN | WEIGHT: 183.4 LBS

## 2025-04-09 DIAGNOSIS — I10 ESSENTIAL HYPERTENSION: ICD-10-CM

## 2025-04-09 DIAGNOSIS — E78.00 HYPERCHOLESTEREMIA: ICD-10-CM

## 2025-04-09 DIAGNOSIS — I10 ESSENTIAL HYPERTENSION: Primary | ICD-10-CM

## 2025-04-09 DIAGNOSIS — K29.50 CHRONIC GASTRITIS WITHOUT BLEEDING, UNSPECIFIED GASTRITIS TYPE: ICD-10-CM

## 2025-04-09 PROCEDURE — 1159F MED LIST DOCD IN RCRD: CPT | Performed by: STUDENT IN AN ORGANIZED HEALTH CARE EDUCATION/TRAINING PROGRAM

## 2025-04-09 PROCEDURE — G8417 CALC BMI ABV UP PARAM F/U: HCPCS | Performed by: STUDENT IN AN ORGANIZED HEALTH CARE EDUCATION/TRAINING PROGRAM

## 2025-04-09 PROCEDURE — 3017F COLORECTAL CA SCREEN DOC REV: CPT | Performed by: STUDENT IN AN ORGANIZED HEALTH CARE EDUCATION/TRAINING PROGRAM

## 2025-04-09 PROCEDURE — 3078F DIAST BP <80 MM HG: CPT | Performed by: STUDENT IN AN ORGANIZED HEALTH CARE EDUCATION/TRAINING PROGRAM

## 2025-04-09 PROCEDURE — 1160F RVW MEDS BY RX/DR IN RCRD: CPT | Performed by: STUDENT IN AN ORGANIZED HEALTH CARE EDUCATION/TRAINING PROGRAM

## 2025-04-09 PROCEDURE — 3074F SYST BP LT 130 MM HG: CPT | Performed by: STUDENT IN AN ORGANIZED HEALTH CARE EDUCATION/TRAINING PROGRAM

## 2025-04-09 PROCEDURE — 1036F TOBACCO NON-USER: CPT | Performed by: STUDENT IN AN ORGANIZED HEALTH CARE EDUCATION/TRAINING PROGRAM

## 2025-04-09 PROCEDURE — 99214 OFFICE O/P EST MOD 30 MIN: CPT | Performed by: STUDENT IN AN ORGANIZED HEALTH CARE EDUCATION/TRAINING PROGRAM

## 2025-04-09 PROCEDURE — 1126F AMNT PAIN NOTED NONE PRSNT: CPT | Performed by: STUDENT IN AN ORGANIZED HEALTH CARE EDUCATION/TRAINING PROGRAM

## 2025-04-09 PROCEDURE — 1123F ACP DISCUSS/DSCN MKR DOCD: CPT | Performed by: STUDENT IN AN ORGANIZED HEALTH CARE EDUCATION/TRAINING PROGRAM

## 2025-04-09 PROCEDURE — G8427 DOCREV CUR MEDS BY ELIG CLIN: HCPCS | Performed by: STUDENT IN AN ORGANIZED HEALTH CARE EDUCATION/TRAINING PROGRAM

## 2025-04-09 PROCEDURE — G2211 COMPLEX E/M VISIT ADD ON: HCPCS | Performed by: STUDENT IN AN ORGANIZED HEALTH CARE EDUCATION/TRAINING PROGRAM

## 2025-04-09 RX ORDER — OMEPRAZOLE 20 MG/1
20 CAPSULE, DELAYED RELEASE ORAL 2 TIMES DAILY
Qty: 180 CAPSULE | Refills: 2 | Status: SHIPPED | OUTPATIENT
Start: 2025-04-09

## 2025-04-09 RX ORDER — CHLORTHALIDONE 25 MG/1
12.5 TABLET ORAL DAILY
Qty: 45 TABLET | Refills: 2 | Status: SHIPPED | OUTPATIENT
Start: 2025-04-09

## 2025-04-09 RX ORDER — SIMVASTATIN 40 MG
40 TABLET ORAL NIGHTLY
Qty: 90 TABLET | Refills: 2 | Status: SHIPPED | OUTPATIENT
Start: 2025-04-09

## 2025-04-09 RX ORDER — POTASSIUM CHLORIDE 1500 MG/1
40 TABLET, EXTENDED RELEASE ORAL DAILY
Qty: 180 TABLET | Refills: 2 | Status: SHIPPED | OUTPATIENT
Start: 2025-04-09

## 2025-04-09 RX ORDER — IPRATROPIUM BROMIDE 17 UG/1
2 AEROSOL, METERED RESPIRATORY (INHALATION) 4 TIMES DAILY PRN
COMMUNITY
Start: 2025-03-17

## 2025-04-09 SDOH — ECONOMIC STABILITY: FOOD INSECURITY: WITHIN THE PAST 12 MONTHS, THE FOOD YOU BOUGHT JUST DIDN'T LAST AND YOU DIDN'T HAVE MONEY TO GET MORE.: NEVER TRUE

## 2025-04-09 SDOH — ECONOMIC STABILITY: FOOD INSECURITY: WITHIN THE PAST 12 MONTHS, YOU WORRIED THAT YOUR FOOD WOULD RUN OUT BEFORE YOU GOT MONEY TO BUY MORE.: NEVER TRUE

## 2025-04-09 ASSESSMENT — PATIENT HEALTH QUESTIONNAIRE - PHQ9
SUM OF ALL RESPONSES TO PHQ QUESTIONS 1-9: 0
SUM OF ALL RESPONSES TO PHQ QUESTIONS 1-9: 0
1. LITTLE INTEREST OR PLEASURE IN DOING THINGS: NOT AT ALL
SUM OF ALL RESPONSES TO PHQ QUESTIONS 1-9: 0
SUM OF ALL RESPONSES TO PHQ QUESTIONS 1-9: 0
2. FEELING DOWN, DEPRESSED OR HOPELESS: NOT AT ALL

## 2025-04-09 NOTE — PROGRESS NOTES
Chief Complaint   Patient presents with    Hypertension     Follow-up      \"Have you been to the ER, urgent care clinic since your last visit?  Hospitalized since your last visit?\"    NO    “Have you seen or consulted any other health care providers outside our system since your last visit?”    NO            
patient as well. Informed patient to return to the office if new symptoms arise.      Ching Rowell MD  [facility]

## 2025-04-10 ENCOUNTER — RESULTS FOLLOW-UP (OUTPATIENT)
Age: 65
End: 2025-04-10

## 2025-04-10 LAB
ANION GAP SERPL CALC-SCNC: 5 MMOL/L (ref 2–12)
BUN SERPL-MCNC: 13 MG/DL (ref 6–20)
BUN/CREAT SERPL: 16 (ref 12–20)
CALCIUM SERPL-MCNC: 9.2 MG/DL (ref 8.5–10.1)
CHLORIDE SERPL-SCNC: 100 MMOL/L (ref 97–108)
CO2 SERPL-SCNC: 33 MMOL/L (ref 21–32)
CREAT SERPL-MCNC: 0.81 MG/DL (ref 0.55–1.02)
GLUCOSE SERPL-MCNC: 92 MG/DL (ref 65–100)
POTASSIUM SERPL-SCNC: 3.6 MMOL/L (ref 3.5–5.1)
SODIUM SERPL-SCNC: 138 MMOL/L (ref 136–145)

## 2025-05-12 DIAGNOSIS — I10 ESSENTIAL HYPERTENSION: ICD-10-CM

## 2025-05-12 RX ORDER — POTASSIUM CHLORIDE 1500 MG/1
40 TABLET, EXTENDED RELEASE ORAL DAILY
Qty: 180 TABLET | Refills: 2 | OUTPATIENT
Start: 2025-05-12

## 2025-05-12 RX ORDER — CHLORTHALIDONE 25 MG/1
12.5 TABLET ORAL DAILY
Qty: 45 TABLET | Refills: 2 | OUTPATIENT
Start: 2025-05-12

## 2025-05-27 ENCOUNTER — TELEPHONE (OUTPATIENT)
Dept: PRIMARY CARE CLINIC | Facility: CLINIC | Age: 65
End: 2025-05-27

## 2025-05-27 NOTE — TELEPHONE ENCOUNTER
----- Message from Natalie HUGO sent at 5/27/2025  9:42 AM EDT -----  Regarding: ECC Appointment Request  ECC Appointment Request    Patient needs appointment for ECC Appointment Type: New Patient.    Patient Requested Dates(s):around October 9,2025 or Tuesdays or Thursdays   Patient Requested Time:any time  Provider Name:Britney Blackwell DO    Reason for Appointment Request: New Patient - Requested Provider unavailable  --------------------------------------------------------------------------------------------------------------------------    Relationship to Patient: Self     Call Back Information: OK to leave message on voicemail  Preferred Call Back Number: Phone [phone] (home)

## 2025-05-27 NOTE — TELEPHONE ENCOUNTER
Left a voicemail message to called the office to schedule a NP appointment with the NP or PA    ----- Message from Natalie HUGO sent at 5/27/2025  9:42 AM EDT -----  Regarding: ECC Appointment Request  ECC Appointment Request    Patient needs appointment for ECC Appointment Type: New Patient.    Patient Requested Dates(s):around October 9,2025 or Tuesdays or Thursdays   Patient Requested Time:any time  Provider Name:Britney Blackwell DO    Reason for Appointment Request: New Patient - Requested Provider unavailable  --------------------------------------------------------------------------------------------------------------------------    Relationship to Patient: Self     Call Back Information: OK to leave message on voicemail  Preferred Call Back Number: Phone [phone] (home)

## 2025-06-24 SDOH — HEALTH STABILITY: PHYSICAL HEALTH: ON AVERAGE, HOW MANY MINUTES DO YOU ENGAGE IN EXERCISE AT THIS LEVEL?: 50 MIN

## 2025-06-24 SDOH — HEALTH STABILITY: PHYSICAL HEALTH: ON AVERAGE, HOW MANY DAYS PER WEEK DO YOU ENGAGE IN MODERATE TO STRENUOUS EXERCISE (LIKE A BRISK WALK)?: 5 DAYS

## 2025-06-25 ENCOUNTER — OFFICE VISIT (OUTPATIENT)
Age: 65
End: 2025-06-25
Payer: MEDICARE

## 2025-06-25 VITALS
RESPIRATION RATE: 18 BRPM | SYSTOLIC BLOOD PRESSURE: 104 MMHG | DIASTOLIC BLOOD PRESSURE: 72 MMHG | BODY MASS INDEX: 35.38 KG/M2 | HEART RATE: 83 BPM | WEIGHT: 187.4 LBS | TEMPERATURE: 98 F | HEIGHT: 61 IN

## 2025-06-25 DIAGNOSIS — J01.00 ACUTE NON-RECURRENT MAXILLARY SINUSITIS: Primary | ICD-10-CM

## 2025-06-25 PROCEDURE — 1159F MED LIST DOCD IN RCRD: CPT | Performed by: NURSE PRACTITIONER

## 2025-06-25 PROCEDURE — 3074F SYST BP LT 130 MM HG: CPT | Performed by: NURSE PRACTITIONER

## 2025-06-25 PROCEDURE — 1123F ACP DISCUSS/DSCN MKR DOCD: CPT | Performed by: NURSE PRACTITIONER

## 2025-06-25 PROCEDURE — 1125F AMNT PAIN NOTED PAIN PRSNT: CPT | Performed by: NURSE PRACTITIONER

## 2025-06-25 PROCEDURE — G8427 DOCREV CUR MEDS BY ELIG CLIN: HCPCS | Performed by: NURSE PRACTITIONER

## 2025-06-25 PROCEDURE — 3017F COLORECTAL CA SCREEN DOC REV: CPT | Performed by: NURSE PRACTITIONER

## 2025-06-25 PROCEDURE — 99213 OFFICE O/P EST LOW 20 MIN: CPT | Performed by: NURSE PRACTITIONER

## 2025-06-25 PROCEDURE — G8417 CALC BMI ABV UP PARAM F/U: HCPCS | Performed by: NURSE PRACTITIONER

## 2025-06-25 PROCEDURE — 1036F TOBACCO NON-USER: CPT | Performed by: NURSE PRACTITIONER

## 2025-06-25 PROCEDURE — 3078F DIAST BP <80 MM HG: CPT | Performed by: NURSE PRACTITIONER

## 2025-06-25 RX ORDER — AZITHROMYCIN 250 MG/1
TABLET, FILM COATED ORAL
Qty: 6 TABLET | Refills: 0 | Status: SHIPPED | OUTPATIENT
Start: 2025-06-25 | End: 2025-07-05

## 2025-06-25 SDOH — ECONOMIC STABILITY: FOOD INSECURITY: WITHIN THE PAST 12 MONTHS, YOU WORRIED THAT YOUR FOOD WOULD RUN OUT BEFORE YOU GOT MONEY TO BUY MORE.: NEVER TRUE

## 2025-06-25 SDOH — ECONOMIC STABILITY: FOOD INSECURITY: WITHIN THE PAST 12 MONTHS, THE FOOD YOU BOUGHT JUST DIDN'T LAST AND YOU DIDN'T HAVE MONEY TO GET MORE.: NEVER TRUE

## 2025-06-25 ASSESSMENT — PATIENT HEALTH QUESTIONNAIRE - PHQ9
SUM OF ALL RESPONSES TO PHQ QUESTIONS 1-9: 0
2. FEELING DOWN, DEPRESSED OR HOPELESS: NOT AT ALL
1. LITTLE INTEREST OR PLEASURE IN DOING THINGS: NOT AT ALL
SUM OF ALL RESPONSES TO PHQ QUESTIONS 1-9: 0

## 2025-06-25 ASSESSMENT — ENCOUNTER SYMPTOMS
SINUS PAIN: 1
EYES NEGATIVE: 1
RESPIRATORY NEGATIVE: 1
SINUS PRESSURE: 1

## 2025-06-25 NOTE — PROGRESS NOTES
Margaux Vicente (:  1960) is a 64 y.o. adult,Established patient, here for evaluation of the following chief complaint(s):  Sinus Pressure (Mild congestion)         Assessment & Plan  Acute non-recurrent maxillary sinusitis   Cold compresses to face, flonase daily, ibuprofen and will do a round of antibiotics.  Id no improvement follow up.  Otherwise, will see her back in 4 months for her AWV    Orders:    azithromycin (ZITHROMAX) 250 MG tablet; 500mg on day 1 followed by 250mg on days 2 - 5      Return in about 4 months (around 10/25/2025).       Subjective   Pt is here with complaints of 2 weeks of right sinus tenderness and swelling.  She is not able to blow anything out of her nose.  No fever.  No post nasal drip.  No vision changes.  No cough.  She has some twitching of her right eye.        Review of Systems   Constitutional:  Negative for chills and fever.   HENT:  Positive for congestion, sinus pressure and sinus pain.    Eyes: Negative.    Respiratory: Negative.     Cardiovascular: Negative.    Neurological: Negative.           Objective   Physical Exam  Vitals reviewed.   Constitutional:       Appearance: Normal appearance.   HENT:      Head: Normocephalic and atraumatic.      Right Ear: Tympanic membrane and ear canal normal.      Left Ear: Tympanic membrane and ear canal normal.      Nose:      Comments: Tenderness in the right maxillary sinus and mild tenderness right frontal sinus.  Cardiovascular:      Rate and Rhythm: Normal rate and regular rhythm.      Pulses: Normal pulses.      Heart sounds: Normal heart sounds.   Pulmonary:      Effort: Pulmonary effort is normal.   Musculoskeletal:      Cervical back: Normal range of motion and neck supple.   Neurological:      Mental Status: She is alert.   Psychiatric:         Mood and Affect: Mood normal.         Behavior: Behavior normal.                  An electronic signature was used to authenticate this note.    --HAZEL Dixon

## 2025-06-25 NOTE — PROGRESS NOTES
Chief Complaint   Patient presents with    Sinus Pressure     Mild congestion       \"Have you been to the ER, urgent care clinic since your last visit?  Hospitalized since your last visit?\"    NO    “Have you seen or consulted any other health care providers outside of [facility] since your last visit?”    NO            Vitals:    25 1122   BP: 104/72   Pulse: 83   Resp: 18   Temp: 98 °F (36.7 °C)      Health Maintenance Due   Topic Date Due    Hepatitis A vaccine (1 of 2 - Risk 2-dose series) Never done    Hepatitis B vaccine (1 of 3 - Risk 3-dose series) Never done    Respiratory Syncytial Virus (RSV) Pregnant or age 60 yrs+ (1 - Risk 60-74 years 1-dose series) Never done    COVID-19 Vaccine ( season) 2024      The patient, Margaux Vicente, identity was verified by name and .

## 2025-08-14 ENCOUNTER — OFFICE VISIT (OUTPATIENT)
Age: 65
End: 2025-08-14
Payer: MEDICARE

## 2025-08-14 VITALS
DIASTOLIC BLOOD PRESSURE: 88 MMHG | WEIGHT: 188.6 LBS | SYSTOLIC BLOOD PRESSURE: 136 MMHG | OXYGEN SATURATION: 98 % | TEMPERATURE: 97.1 F | HEIGHT: 61 IN | RESPIRATION RATE: 106 BRPM | BODY MASS INDEX: 35.61 KG/M2 | HEART RATE: 82 BPM

## 2025-08-14 DIAGNOSIS — B37.9 CANDIDIASIS: Primary | ICD-10-CM

## 2025-08-14 PROCEDURE — G8427 DOCREV CUR MEDS BY ELIG CLIN: HCPCS | Performed by: NURSE PRACTITIONER

## 2025-08-14 PROCEDURE — 1126F AMNT PAIN NOTED NONE PRSNT: CPT | Performed by: NURSE PRACTITIONER

## 2025-08-14 PROCEDURE — 1123F ACP DISCUSS/DSCN MKR DOCD: CPT | Performed by: NURSE PRACTITIONER

## 2025-08-14 PROCEDURE — 3079F DIAST BP 80-89 MM HG: CPT | Performed by: NURSE PRACTITIONER

## 2025-08-14 PROCEDURE — 3017F COLORECTAL CA SCREEN DOC REV: CPT | Performed by: NURSE PRACTITIONER

## 2025-08-14 PROCEDURE — 1159F MED LIST DOCD IN RCRD: CPT | Performed by: NURSE PRACTITIONER

## 2025-08-14 PROCEDURE — 99213 OFFICE O/P EST LOW 20 MIN: CPT | Performed by: NURSE PRACTITIONER

## 2025-08-14 PROCEDURE — G8417 CALC BMI ABV UP PARAM F/U: HCPCS | Performed by: NURSE PRACTITIONER

## 2025-08-14 PROCEDURE — 1036F TOBACCO NON-USER: CPT | Performed by: NURSE PRACTITIONER

## 2025-08-14 PROCEDURE — 3075F SYST BP GE 130 - 139MM HG: CPT | Performed by: NURSE PRACTITIONER

## 2025-08-14 RX ORDER — CLOTRIMAZOLE 1 %
CREAM (GRAM) TOPICAL
Qty: 30 G | Refills: 1 | Status: SHIPPED | OUTPATIENT
Start: 2025-08-14 | End: 2025-08-21

## 2025-08-14 ASSESSMENT — ENCOUNTER SYMPTOMS
RESPIRATORY NEGATIVE: 1
GASTROINTESTINAL NEGATIVE: 1